# Patient Record
Sex: FEMALE | Race: WHITE | Employment: PART TIME | URBAN - METROPOLITAN AREA
[De-identification: names, ages, dates, MRNs, and addresses within clinical notes are randomized per-mention and may not be internally consistent; named-entity substitution may affect disease eponyms.]

---

## 2023-10-08 SDOH — ECONOMIC STABILITY: FOOD INSECURITY: WITHIN THE PAST 12 MONTHS, THE FOOD YOU BOUGHT JUST DIDN'T LAST AND YOU DIDN'T HAVE MONEY TO GET MORE.: NEVER TRUE

## 2023-10-08 SDOH — ECONOMIC STABILITY: FOOD INSECURITY: WITHIN THE PAST 12 MONTHS, YOU WORRIED THAT YOUR FOOD WOULD RUN OUT BEFORE YOU GOT MONEY TO BUY MORE.: NEVER TRUE

## 2023-10-08 SDOH — ECONOMIC STABILITY: TRANSPORTATION INSECURITY
IN THE PAST 12 MONTHS, HAS LACK OF TRANSPORTATION KEPT YOU FROM MEETINGS, WORK, OR FROM GETTING THINGS NEEDED FOR DAILY LIVING?: NO

## 2023-10-08 SDOH — ECONOMIC STABILITY: HOUSING INSECURITY
IN THE LAST 12 MONTHS, WAS THERE A TIME WHEN YOU DID NOT HAVE A STEADY PLACE TO SLEEP OR SLEPT IN A SHELTER (INCLUDING NOW)?: NO

## 2023-10-08 SDOH — ECONOMIC STABILITY: INCOME INSECURITY: HOW HARD IS IT FOR YOU TO PAY FOR THE VERY BASICS LIKE FOOD, HOUSING, MEDICAL CARE, AND HEATING?: NOT VERY HARD

## 2023-10-09 ENCOUNTER — OFFICE VISIT (OUTPATIENT)
Age: 23
End: 2023-10-09

## 2023-10-09 VITALS
DIASTOLIC BLOOD PRESSURE: 81 MMHG | HEIGHT: 66 IN | HEART RATE: 87 BPM | WEIGHT: 162 LBS | SYSTOLIC BLOOD PRESSURE: 119 MMHG | BODY MASS INDEX: 26.03 KG/M2

## 2023-10-09 DIAGNOSIS — Z12.4 CERVICAL CANCER SCREENING: Primary | ICD-10-CM

## 2023-10-09 ASSESSMENT — ENCOUNTER SYMPTOMS
CONSTIPATION: 0
ABDOMINAL DISTENTION: 0
BLOOD IN STOOL: 0
ABDOMINAL PAIN: 0
VOMITING: 0
NAUSEA: 0
RECTAL PAIN: 0
DIARRHEA: 0

## 2023-10-09 ASSESSMENT — PATIENT HEALTH QUESTIONNAIRE - PHQ9
SUM OF ALL RESPONSES TO PHQ QUESTIONS 1-9: 0
2. FEELING DOWN, DEPRESSED OR HOPELESS: 0
1. LITTLE INTEREST OR PLEASURE IN DOING THINGS: 0
SUM OF ALL RESPONSES TO PHQ9 QUESTIONS 1 & 2: 0
SUM OF ALL RESPONSES TO PHQ QUESTIONS 1-9: 0

## 2023-10-12 LAB — GYNECOLOGY CYTOLOGY REPORT: NORMAL

## 2024-05-06 ENCOUNTER — TELEPHONE (OUTPATIENT)
Age: 24
End: 2024-05-06

## 2024-05-06 NOTE — TELEPHONE ENCOUNTER
New Ob- lmp 3/2/2024.positive pregnancy test on 5/4/24. on please  place an order for dating ultrasound, for scheduling.

## 2024-05-07 DIAGNOSIS — N91.2 AMENORRHEA: Primary | ICD-10-CM

## 2024-05-07 NOTE — PROGRESS NOTES
Positive home pregnancy test dating ultrasound ordered   Exclude Pending Lab, Cardiology, and Radiology orders from printing on the Patient's Discharge Instructions, due to Privacy Concerns.

## 2024-05-29 ENCOUNTER — ANCILLARY PROCEDURE (OUTPATIENT)
Dept: OBGYN CLINIC | Age: 24
End: 2024-05-29
Payer: COMMERCIAL

## 2024-05-29 ENCOUNTER — INITIAL PRENATAL (OUTPATIENT)
Dept: OBGYN CLINIC | Age: 24
End: 2024-05-29
Payer: COMMERCIAL

## 2024-05-29 DIAGNOSIS — Z34.90 PREGNANCY, UNSPECIFIED GESTATIONAL AGE: Primary | ICD-10-CM

## 2024-05-29 DIAGNOSIS — O26.849 SIGNIFICANT DISCREPANCY BETWEEN UTERINE SIZE AND CLINICAL DATES, ANTEPARTUM: ICD-10-CM

## 2024-05-29 DIAGNOSIS — Z34.00 PRIMIGRAVIDA, ANTEPARTUM: ICD-10-CM

## 2024-05-29 PROCEDURE — 99999 PR OFFICE/OUTPT VISIT,PROCEDURE ONLY: CPT | Performed by: OBSTETRICS & GYNECOLOGY

## 2024-05-29 PROCEDURE — 76813 OB US NUCHAL MEAS 1 GEST: CPT | Performed by: OBSTETRICS & GYNECOLOGY

## 2024-05-29 PROCEDURE — 99203 OFFICE O/P NEW LOW 30 MIN: CPT

## 2024-05-29 NOTE — PROGRESS NOTES
24    Lisa Rosado, DO  [address] [address]     RE:  ANNI SANDOVAL  : 2000   AGE: 23 y.o.      Dear Dr. Rosado:    Anni Sandoval was scheduled for a fetal ultrasound assessment only.  An ultrasound report is included under the imaging tab from today's date.    Transabdominal imaging was performed.  A living dorman intrauterine embryo was identified with a crown-rump length of 21.4 mm, consistent with an estimated gestational age of 8 weeks 5 days.  These dating parameters are inconsistent with the patient's last menstrual period dating parameters.  The embryonic heart rate was 171 bpm.  The amniotic fluid volume was within normal limits.  A yolk sac was visualized.  Visualization of the embryonic anatomy is limited secondary to the early gestational age.  There was no significant free peritoneal fluid noted.  There were no apparent adnexal masses.    No further follow-up assessments have been scheduled in our office, however; we would be happy to see your patient at any time if you request.    If you have any questions regarding her management, please contact me at your convenience and thank you for allowing me to participate in her care    Sincerely,        Nick Marcos MD, MS, FACOG, FACS, RDCS, RDMS, RVT  Director Maternal-Fetal Medicine  [facility]  [phone]

## 2024-06-26 ENCOUNTER — ROUTINE PRENATAL (OUTPATIENT)
Dept: OBGYN | Age: 24
End: 2024-06-26
Payer: COMMERCIAL

## 2024-06-26 VITALS
BODY MASS INDEX: 26.27 KG/M2 | WEIGHT: 160.3 LBS | DIASTOLIC BLOOD PRESSURE: 78 MMHG | HEART RATE: 81 BPM | SYSTOLIC BLOOD PRESSURE: 118 MMHG

## 2024-06-26 DIAGNOSIS — O99.891 ASYMPTOMATIC BACTERIURIA DURING PREGNANCY IN SECOND TRIMESTER: Primary | ICD-10-CM

## 2024-06-26 DIAGNOSIS — Z34.91 ENCOUNTER FOR SUPERVISION OF NORMAL PREGNANCY IN FIRST TRIMESTER, UNSPECIFIED GRAVIDITY: ICD-10-CM

## 2024-06-26 DIAGNOSIS — R82.71 ASYMPTOMATIC BACTERIURIA DURING PREGNANCY IN SECOND TRIMESTER: Primary | ICD-10-CM

## 2024-06-26 DIAGNOSIS — Z34.91 ENCOUNTER FOR SUPERVISION OF NORMAL PREGNANCY IN FIRST TRIMESTER, UNSPECIFIED GRAVIDITY: Primary | ICD-10-CM

## 2024-06-26 LAB
AMPHETAMINE SCREEN URINE: NEGATIVE
BARBITURATE SCREEN URINE: NEGATIVE
BENZODIAZEPINE SCREEN, URINE: NEGATIVE
BUPRENORPHINE URINE: NEGATIVE
CANNABINOID SCREEN URINE: NEGATIVE
COCAINE METABOLITE, URINE: NEGATIVE
FENTANYL URINE: NEGATIVE
METHADONE SCREEN, URINE: NEGATIVE
OPIATES, URINE: NEGATIVE
OXYCODONE SCREEN URINE: NEGATIVE
PCP,URINE: NEGATIVE
TEST INFORMATION: NORMAL

## 2024-06-26 PROCEDURE — 99213 OFFICE O/P EST LOW 20 MIN: CPT | Performed by: OBSTETRICS & GYNECOLOGY

## 2024-06-26 PROCEDURE — 0500F INITIAL PRENATAL CARE VISIT: CPT | Performed by: OBSTETRICS & GYNECOLOGY

## 2024-06-26 PROCEDURE — 81002 URINALYSIS NONAUTO W/O SCOPE: CPT | Performed by: OBSTETRICS & GYNECOLOGY

## 2024-06-26 ASSESSMENT — PATIENT HEALTH QUESTIONNAIRE - PHQ9
2. FEELING DOWN, DEPRESSED OR HOPELESS: NOT AT ALL
SUM OF ALL RESPONSES TO PHQ QUESTIONS 1-9: 0
SUM OF ALL RESPONSES TO PHQ9 QUESTIONS 1 & 2: 0
1. LITTLE INTEREST OR PLEASURE IN DOING THINGS: NOT AT ALL

## 2024-06-26 NOTE — PROGRESS NOTES
Anni and her  present for initial prenatal visit dates confirmed EDC 1 - 3 - 25.  No significant PMH.  No TAD.  GC chlamydia culture obtained  [facility] and [facility] reviewed and ordered  Initial prenatal labs ordered  First trimester changes  Handout on mental health resources, OTC meds, Tdap, flu shot, foods

## 2024-06-26 NOTE — PROGRESS NOTES
New OB patient alert and pleasant today with no complaints.  Here today for initial prenatal visit. Previously seen by Dr Harding in the past. Fetal Nuchal US in chart.   Unable to provide urine specimen at this time. Patient states she had a normal pap done last year by Dr Harding. Urine cc, and vag culture obtained, labeled and sent to lab.  Discharge instructions given and patient directed to call the office with any questions or concerns and verbalized understanding.

## 2024-06-28 LAB
C TRACH DNA GENITAL QL NAA+PROBE: NEGATIVE
N. GONORRHOEAE DNA: NEGATIVE

## 2024-06-29 ENCOUNTER — HOSPITAL ENCOUNTER (OUTPATIENT)
Age: 24
Discharge: HOME | End: 2024-06-29

## 2024-06-29 LAB
CULTURE: ABNORMAL
SPECIMEN DESCRIPTION: ABNORMAL

## 2024-07-01 ENCOUNTER — HOSPITAL ENCOUNTER (OUTPATIENT)
Age: 24
Discharge: HOME | End: 2024-07-01
Payer: COMMERCIAL

## 2024-07-01 DIAGNOSIS — Z34.91 ENCOUNTER FOR SUPERVISION OF NORMAL PREGNANCY IN FIRST TRIMESTER, UNSPECIFIED GRAVIDITY: ICD-10-CM

## 2024-07-01 LAB
ABO + RH BLD: NORMAL
ERYTHROCYTE [DISTWIDTH] IN BLOOD BY AUTOMATED COUNT: 13 % (ref 11.5–15)
HCT VFR BLD AUTO: 38.2 % (ref 34–48)
HGB BLD-MCNC: 12.3 G/DL (ref 11.5–15.5)
MCH RBC QN AUTO: 30.2 PG (ref 26–35)
MCHC RBC AUTO-ENTMCNC: 32.2 G/DL (ref 32–34.5)
MCV RBC AUTO: 93.9 FL (ref 80–99.9)
PLATELET # BLD AUTO: 317 K/UL (ref 130–450)
PMV BLD AUTO: 9.7 FL (ref 7–12)
RBC # BLD AUTO: 4.07 M/UL (ref 3.5–5.5)
WBC OTHER # BLD: 12.5 K/UL (ref 4.5–11.5)

## 2024-07-01 PROCEDURE — 87389 HIV-1 AG W/HIV-1&-2 AB AG IA: CPT

## 2024-07-01 PROCEDURE — 86901 BLOOD TYPING SEROLOGIC RH(D): CPT

## 2024-07-01 PROCEDURE — 87340 HEPATITIS B SURFACE AG IA: CPT

## 2024-07-01 PROCEDURE — 86762 RUBELLA ANTIBODY: CPT

## 2024-07-01 PROCEDURE — 86592 SYPHILIS TEST NON-TREP QUAL: CPT

## 2024-07-01 PROCEDURE — 86803 HEPATITIS C AB TEST: CPT

## 2024-07-01 PROCEDURE — 36415 COLL VENOUS BLD VENIPUNCTURE: CPT

## 2024-07-01 PROCEDURE — 85027 COMPLETE CBC AUTOMATED: CPT

## 2024-07-01 PROCEDURE — 86900 BLOOD TYPING SEROLOGIC ABO: CPT

## 2024-07-02 LAB
HBV SURFACE AG SERPL QL IA: NONREACTIVE
HCV AB SERPL QL IA: NONREACTIVE
HIV 1+2 AB+HIV1 P24 AG SERPL QL IA: NONREACTIVE
RPR SER QL: NONREACTIVE
RUBV IGG SERPL QL IA: NORMAL IU/ML

## 2024-07-02 RX ORDER — CEPHALEXIN 500 MG/1
500 CAPSULE ORAL 3 TIMES DAILY
Qty: 15 CAPSULE | Refills: 0 | Status: SHIPPED | OUTPATIENT
Start: 2024-07-02 | End: 2024-07-07

## 2024-07-08 LAB
Lab: NORMAL
NTRA FETAL FRACTION: NORMAL
NTRA GENDER OF FETUS: NORMAL
NTRA MONOSOMY X AGE-BASED RISK TEXT: NORMAL
NTRA MONOSOMY X RESULT TEXT: NORMAL
NTRA MONOSOMY X RISK SCORE TEXT: NORMAL
NTRA TRIPLOIDY RESULT TEXT: NORMAL
NTRA TRISOMY 13 AGE-BASED RISK TEXT: NORMAL
NTRA TRISOMY 13 RESULT TEXT: NORMAL
NTRA TRISOMY 13 RISK SCORE TEXT: NORMAL
NTRA TRISOMY 18 AGE-BASED RISK TEXT: NORMAL
NTRA TRISOMY 18 RESULT TEXT: NORMAL
NTRA TRISOMY 18 RISK SCORE TEXT: NORMAL
NTRA TRISOMY 21 AGE-BASED RISK TEXT: NORMAL
NTRA TRISOMY 21 RESULT TEXT: NORMAL
NTRA TRISOMY 21 RISK SCORE TEXT: NORMAL

## 2024-07-09 ENCOUNTER — TELEPHONE (OUTPATIENT)
Dept: OBGYN | Age: 24
End: 2024-07-09

## 2024-07-09 NOTE — TELEPHONE ENCOUNTER
----- Message from Lisa Rosado DO sent at 7/9/2024  9:33 AM EDT -----  Please let her know that her panorama was low risk for Down syndrome, chromosome 18 and 13 if she wants to know the sex it is a male

## 2024-07-09 NOTE — TELEPHONE ENCOUNTER
Notifed, she would like the sex to be a surprise, so she will  results at her next visit in a sealed envelope.

## 2024-07-10 LAB
Lab: ABNORMAL
Lab: POSITIVE
NTRA ALPHA-THALASSEMIA: NEGATIVE
NTRA BETA-HEMOGLOBINOPATHIES: NEGATIVE
NTRA CANAVAN DISEASE: NEGATIVE
NTRA CYSTIC FIBROSIS: NEGATIVE
NTRA DUCHENNE/BECKER MUSCULAR DYSTROPHY: NEGATIVE
NTRA FAMILIAL DYSAUTONOMIA: NEGATIVE
NTRA FRAGILE X SYNDROME: NEGATIVE
NTRA GALACTOSEMIA: NEGATIVE
NTRA GAUCHER DISEASE: NEGATIVE
NTRA MEDIUM CHAIN ACYL-COA DEHYDROGENASE DEFICIENCY: POSITIVE
NTRA POLYCYSTIC KIDNEY DISEASE, AUTOSOMAL RECESSIVE: NEGATIVE
NTRA SMITH-LEMLI-OPITZ SYNDROME: NEGATIVE
NTRA SPINAL MUSCULAR ATROPHY: NEGATIVE
NTRA TAY-SACHS DISEASE: NEGATIVE

## 2024-07-11 ENCOUNTER — TELEPHONE (OUTPATIENT)
Dept: OBGYN | Age: 24
End: 2024-07-11

## 2024-07-11 NOTE — TELEPHONE ENCOUNTER
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  Her partner should have a screening performed if he is positive for the same disease then there is a 1 in 4 chance that the baby may have that condition.  This is a condition where the person is unable to process fats and if not treated can cause problems.  The chances of them being a carrier for the same disease is very small but that is the recommendation that he be screened.  We can order that if he is agreeable to having it performed.

## 2024-07-11 NOTE — TELEPHONE ENCOUNTER
Patient notified and states that he will most likely want tested and will discuss at next visit.   Also notified of panorama results and will get gender wrote down at that visit as well.

## 2024-07-24 ENCOUNTER — ROUTINE PRENATAL (OUTPATIENT)
Dept: OBGYN | Age: 24
End: 2024-07-24
Payer: COMMERCIAL

## 2024-07-24 VITALS
BODY MASS INDEX: 26.38 KG/M2 | SYSTOLIC BLOOD PRESSURE: 126 MMHG | WEIGHT: 161 LBS | HEART RATE: 84 BPM | DIASTOLIC BLOOD PRESSURE: 84 MMHG

## 2024-07-24 DIAGNOSIS — Z3A.16 16 WEEKS GESTATION OF PREGNANCY: Primary | ICD-10-CM

## 2024-07-24 LAB
GLUCOSE URINE, POC: NEGATIVE
PROTEIN UA: NEGATIVE

## 2024-07-24 PROCEDURE — 81002 URINALYSIS NONAUTO W/O SCOPE: CPT | Performed by: OBSTETRICS & GYNECOLOGY

## 2024-07-24 PROCEDURE — 0502F SUBSEQUENT PRENATAL CARE: CPT | Performed by: OBSTETRICS & GYNECOLOGY

## 2024-07-24 PROCEDURE — 99213 OFFICE O/P EST LOW 20 MIN: CPT | Performed by: OBSTETRICS & GYNECOLOGY

## 2024-07-24 NOTE — PROGRESS NOTES
No fetal movement yet.  Occasional headaches.  Reviewed labs.  FA US ordered.  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

## 2024-08-21 ENCOUNTER — ROUTINE PRENATAL (OUTPATIENT)
Dept: OBGYN | Age: 24
End: 2024-08-21
Payer: COMMERCIAL

## 2024-08-21 VITALS
BODY MASS INDEX: 27.22 KG/M2 | HEART RATE: 92 BPM | WEIGHT: 166.1 LBS | DIASTOLIC BLOOD PRESSURE: 72 MMHG | SYSTOLIC BLOOD PRESSURE: 109 MMHG

## 2024-08-21 DIAGNOSIS — Z34.02 ENCOUNTER FOR SUPERVISION OF NORMAL FIRST PREGNANCY IN SECOND TRIMESTER: Primary | ICD-10-CM

## 2024-08-21 LAB
GLUCOSE URINE, POC: NEGATIVE
PROTEIN UA: NEGATIVE

## 2024-08-21 PROCEDURE — 99213 OFFICE O/P EST LOW 20 MIN: CPT | Performed by: OBSTETRICS & GYNECOLOGY

## 2024-08-21 PROCEDURE — 0502F SUBSEQUENT PRENATAL CARE: CPT | Performed by: OBSTETRICS & GYNECOLOGY

## 2024-08-21 NOTE — PROGRESS NOTES
Positive quickening.  No BLD or LOF FA US is scheduled.  Will need 2628-week labs next visit including a urine secondary to asymptomatic bacteriuria in the first trimester.

## 2024-09-04 ENCOUNTER — ANCILLARY PROCEDURE (OUTPATIENT)
Dept: OBGYN CLINIC | Age: 24
End: 2024-09-04
Payer: COMMERCIAL

## 2024-09-04 ENCOUNTER — INITIAL PRENATAL (OUTPATIENT)
Dept: OBGYN CLINIC | Age: 24
End: 2024-09-04
Payer: COMMERCIAL

## 2024-09-04 VITALS
DIASTOLIC BLOOD PRESSURE: 78 MMHG | SYSTOLIC BLOOD PRESSURE: 120 MMHG | WEIGHT: 167.4 LBS | BODY MASS INDEX: 27.43 KG/M2 | HEART RATE: 90 BPM

## 2024-09-04 DIAGNOSIS — Z34.00 PRIMIGRAVIDA, ANTEPARTUM: Primary | ICD-10-CM

## 2024-09-04 DIAGNOSIS — Z3A.22 22 WEEKS GESTATION OF PREGNANCY: ICD-10-CM

## 2024-09-04 DIAGNOSIS — O26.849 SIGNIFICANT DISCREPANCY BETWEEN UTERINE SIZE AND CLINICAL DATES, ANTEPARTUM: ICD-10-CM

## 2024-09-04 LAB
GLUCOSE URINE, POC: NEGATIVE MG/DL
PROTEIN UA: NEGATIVE

## 2024-09-04 PROCEDURE — 76811 OB US DETAILED SNGL FETUS: CPT | Performed by: OBSTETRICS & GYNECOLOGY

## 2024-09-04 PROCEDURE — 99999 PR OFFICE/OUTPT VISIT,PROCEDURE ONLY: CPT | Performed by: OBSTETRICS & GYNECOLOGY

## 2024-09-04 PROCEDURE — 81002 URINALYSIS NONAUTO W/O SCOPE: CPT | Performed by: OBSTETRICS & GYNECOLOGY

## 2024-09-04 PROCEDURE — 99203 OFFICE O/P NEW LOW 30 MIN: CPT | Performed by: OBSTETRICS & GYNECOLOGY

## 2024-09-04 NOTE — PATIENT INSTRUCTIONS
Please arrive for your scheduled appointment at least 15 minutes early with your actual insurance card+ a photo ID. Also if you need any refills ordered or have questions, it may take up 48 hours to reply. Please allow ample time for your refills. Call me when you use last refill. Thank you for your cooperation. You might be having an NST at your next appt. Please eat a large snack or breakfast before coming to office. Thank youCall your primary obstetrician with bleeding, leaking of fluid, abdominal tenderness, headache, blurry vision, epigastric pain and increased urinary frequency.If you are experiencing an emergency and need immediate help, call 911 or go to go emergency room or labor and delivery. Do kick counts after dinner. Call your primary obstetrician if less than 10 kicks in 2 hours after dinner.     Call your primary obstetrician with bleeding, leaking of fluid, abdominal tenderness, headache, blurry vision, epigastric pain and increased urinary frequency.if you are sick, not feeling well or have an infectious process going on please reschedule your appointment by calling [phone]. Also if any family members are not feeling well, please do not bring them to your appointment. We appreciate your cooperation. We are doing this in order to protect our pregnant mothers+ their babies.if you are sick, not feeling well or have an infectious process going on please reschedule your appointment by calling [phone]. Also if any family members are not feeling well, please do not bring them to your appointment. We appreciate your cooperation. We are doing this in order to protect our pregnant mothers+ their babies.

## 2024-09-04 NOTE — PROGRESS NOTES
Pt here for anatomy scan.  Pt denies bleeding, cramping, and lof.  Pt states good fetal movement.  
otherwise as clinically indicated.    The patient is to continue to follow with you in your office for ongoing obstetric care.     PLAN:    As noted above or sooner prn.    Sincerely,        Hayden Eubanks MD

## 2024-09-25 ENCOUNTER — ROUTINE PRENATAL (OUTPATIENT)
Dept: OBGYN | Age: 24
End: 2024-09-25
Payer: COMMERCIAL

## 2024-09-25 VITALS
WEIGHT: 177 LBS | SYSTOLIC BLOOD PRESSURE: 122 MMHG | HEART RATE: 94 BPM | DIASTOLIC BLOOD PRESSURE: 78 MMHG | BODY MASS INDEX: 29.01 KG/M2

## 2024-09-25 DIAGNOSIS — Z3A.25 25 WEEKS GESTATION OF PREGNANCY: Primary | ICD-10-CM

## 2024-09-25 LAB
GLUCOSE URINE, POC: NEGATIVE MG/DL
PROTEIN UA: NEGATIVE

## 2024-09-25 PROCEDURE — 0502F SUBSEQUENT PRENATAL CARE: CPT | Performed by: OBSTETRICS & GYNECOLOGY

## 2024-09-25 PROCEDURE — 81002 URINALYSIS NONAUTO W/O SCOPE: CPT | Performed by: OBSTETRICS & GYNECOLOGY

## 2024-09-25 PROCEDURE — 99213 OFFICE O/P EST LOW 20 MIN: CPT | Performed by: OBSTETRICS & GYNECOLOGY

## 2024-09-27 DIAGNOSIS — Z3A.25 25 WEEKS GESTATION OF PREGNANCY: ICD-10-CM

## 2024-09-27 LAB
AMOUNT GLUCOSE GIVEN: 0 G
COLLECT TIME, 1HR GLUCOSE: 1621
GLUCOSE TOLERANCE TEST 1 HOUR: 76 MG/DL
HCT VFR BLD CALC: 35.6 % (ref 34–48)
HEMOGLOBIN: 11.1 G/DL (ref 11.5–15.5)
MCH RBC QN AUTO: 30 PG (ref 26–35)
MCHC RBC AUTO-ENTMCNC: 31.2 G/DL (ref 32–34.5)
MCV RBC AUTO: 96.2 FL (ref 80–99.9)
PDW BLD-RTO: 12.9 % (ref 11.5–15)
PLATELET # BLD: 311 K/UL (ref 130–450)
PMV BLD AUTO: 10.2 FL (ref 7–12)
RBC # BLD: 3.7 M/UL (ref 3.5–5.5)
WBC # BLD: 12.3 K/UL (ref 4.5–11.5)

## 2024-09-30 LAB — RPR: NONREACTIVE

## 2024-10-23 ENCOUNTER — ROUTINE PRENATAL (OUTPATIENT)
Dept: OBGYN | Age: 24
End: 2024-10-23
Payer: COMMERCIAL

## 2024-10-23 VITALS
DIASTOLIC BLOOD PRESSURE: 84 MMHG | HEIGHT: 66 IN | SYSTOLIC BLOOD PRESSURE: 131 MMHG | BODY MASS INDEX: 29.25 KG/M2 | WEIGHT: 182 LBS | HEART RATE: 90 BPM

## 2024-10-23 DIAGNOSIS — Z3A.29 29 WEEKS GESTATION OF PREGNANCY: Primary | ICD-10-CM

## 2024-10-23 LAB
GLUCOSE URINE, POC: NEGATIVE MG/DL
PROTEIN UA: POSITIVE

## 2024-10-23 PROCEDURE — 81002 URINALYSIS NONAUTO W/O SCOPE: CPT | Performed by: OBSTETRICS & GYNECOLOGY

## 2024-10-23 PROCEDURE — 0502F SUBSEQUENT PRENATAL CARE: CPT | Performed by: OBSTETRICS & GYNECOLOGY

## 2024-10-23 PROCEDURE — 99213 OFFICE O/P EST LOW 20 MIN: CPT | Performed by: OBSTETRICS & GYNECOLOGY

## 2024-10-23 SDOH — ECONOMIC STABILITY: INCOME INSECURITY: HOW HARD IS IT FOR YOU TO PAY FOR THE VERY BASICS LIKE FOOD, HOUSING, MEDICAL CARE, AND HEATING?: NOT HARD AT ALL

## 2024-10-23 SDOH — ECONOMIC STABILITY: FOOD INSECURITY: WITHIN THE PAST 12 MONTHS, THE FOOD YOU BOUGHT JUST DIDN'T LAST AND YOU DIDN'T HAVE MONEY TO GET MORE.: NEVER TRUE

## 2024-10-23 SDOH — ECONOMIC STABILITY: FOOD INSECURITY: WITHIN THE PAST 12 MONTHS, YOU WORRIED THAT YOUR FOOD WOULD RUN OUT BEFORE YOU GOT MONEY TO BUY MORE.: NEVER TRUE

## 2024-10-23 NOTE — PROGRESS NOTES
Patient alert and pleasant with complaints of feet swelling.   Patient states she will get her flu shot at the office where her mother works.   Here today for prenatal visit.  Urine for glucose and protein obtained   Discharge instructions have been discussed with the patient. Patient advised to call our office with any questions or concerns.   Voiced understanding.   Pap smear obtained, labeled and sent to lab

## 2024-10-23 NOTE — PROGRESS NOTES
Good FM.  No BLD LOF or CTN's emotionally doing well.  Questions regarding leg edema reviewed.  Reviewed signs and symptoms of PIH and when to call reviewed signs and symptoms of  labor and when to call and fetal movement counts.  Has upcoming growth ultrasound.

## 2024-10-24 PROBLEM — Z3A.22 22 WEEKS GESTATION OF PREGNANCY: Status: RESOLVED | Noted: 2024-09-04 | Resolved: 2024-10-24

## 2024-11-06 ENCOUNTER — ANCILLARY PROCEDURE (OUTPATIENT)
Dept: OBGYN CLINIC | Age: 24
End: 2024-11-06
Payer: COMMERCIAL

## 2024-11-06 ENCOUNTER — ROUTINE PRENATAL (OUTPATIENT)
Dept: OBGYN CLINIC | Age: 24
End: 2024-11-06
Payer: COMMERCIAL

## 2024-11-06 VITALS
DIASTOLIC BLOOD PRESSURE: 77 MMHG | WEIGHT: 185 LBS | SYSTOLIC BLOOD PRESSURE: 123 MMHG | HEART RATE: 102 BPM | BODY MASS INDEX: 29.86 KG/M2

## 2024-11-06 DIAGNOSIS — O26.849 SIGNIFICANT DISCREPANCY BETWEEN UTERINE SIZE AND CLINICAL DATES, ANTEPARTUM: ICD-10-CM

## 2024-11-06 DIAGNOSIS — Z3A.31 31 WEEKS GESTATION OF PREGNANCY: ICD-10-CM

## 2024-11-06 DIAGNOSIS — Z34.00 PRIMIGRAVIDA, ANTEPARTUM: ICD-10-CM

## 2024-11-06 DIAGNOSIS — Z36.89 ENCOUNTER FOR ULTRASOUND TO CHECK FETAL GROWTH: Primary | ICD-10-CM

## 2024-11-06 LAB
GLUCOSE URINE, POC: NEGATIVE MG/DL
PROTEIN UA: NEGATIVE

## 2024-11-06 PROCEDURE — 99999 PR OFFICE/OUTPT VISIT,PROCEDURE ONLY: CPT | Performed by: OBSTETRICS & GYNECOLOGY

## 2024-11-06 PROCEDURE — 76805 OB US >/= 14 WKS SNGL FETUS: CPT | Performed by: OBSTETRICS & GYNECOLOGY

## 2024-11-06 PROCEDURE — 81002 URINALYSIS NONAUTO W/O SCOPE: CPT | Performed by: OBSTETRICS & GYNECOLOGY

## 2024-11-06 PROCEDURE — 99213 OFFICE O/P EST LOW 20 MIN: CPT

## 2024-11-06 PROCEDURE — 99213 OFFICE O/P EST LOW 20 MIN: CPT | Performed by: OBSTETRICS & GYNECOLOGY

## 2024-11-06 NOTE — PROGRESS NOTES
[facility] [place] [facility]NAL FETAL [facility]IC[facility]  [address] [place] [id number]  Dept: [phone]  Loc: [phone]     2024    RE:  Anni Sandoval     : 2000   AGE: 24 y.o.     Dear Dr. Rosado,    Thank you for allowing me to see Anni Sandoval.  As I'm sure you will recall, Anni Sandoval is a 24 y.o. [id number]'s last menstrual period was 2024. Estimated Date of Delivery: 1/3/25 at 31w5d seen in our office today for the following:    REASON FOR VISIT: Growth     Patient Active Problem List    Diagnosis Date Noted    31 weeks gestation of pregnancy 2024    Significant discrepancy between uterine size and clinical dates, antepartum 2024    Primigravida, antepartum 2024    Idiopathic scoliosis and kyphoscoliosis 2012    Attention deficit disorder 2011        PAST HISTORY:  OB History    Para Term  AB Living   1             SAB IAB Ectopic Molar Multiple Live Births                    # Outcome Date GA Lbr Davon/2nd Weight Sex Type Anes PTL Lv   1 Current                   MEDICAL:  History reviewed. No pertinent past medical history.     SURGICAL:  Past Surgical History:   Procedure Laterality Date    US I&D BREAST ABSCESS DEEP  2015    US I&D BREAST ABSCESS DEEP       ALLERGIES:    No Known Allergies      MEDICATIONS:    Current Outpatient Medications   Medication Sig Dispense Refill    Prenatal MV-Min-Fe Fum-FA-DHA (PRENATAL 1 PO) Take by mouth       No current facility-administered medications for this visit.        Social History     Socioeconomic History    Marital status:      Spouse name: None    Number of children: None    Years of education: None    Highest education level: None   Tobacco Use    Smoking status: Never    Smokeless tobacco: Never   Vaping Use    Vaping status: Never Used   Substance and Sexual Activity    Alcohol use: Yes     Alcohol/week: 2.0

## 2024-11-06 NOTE — PATIENT INSTRUCTIONS
Please arrive for your scheduled appointment at least 15 minutes early with your actual insurance card+ a photo ID. Also if you need any refills ordered or have questions, it may take up 48 hours to reply. Please allow ample time for your refills. Call me when you use last refill. Thank you for your cooperation. You might be having an NST at your next appt. Please eat a large snack or breakfast before coming to office. Thank youCall your primary obstetrician with bleeding, leaking of fluid, abdominal tenderness, headache, blurry vision, epigastric pain and increased urinary frequency.If you are experiencing an emergency and need immediate help, call 911 or go to go emergency room or labor and delivery. Do kick counts after dinner. Call your primary obstetrician if less than 10 kicks in 2 hours after dinner.     Call your primary obstetrician with bleeding, leaking of fluid, abdominal tenderness, headache, blurry vision, epigastric pain and increased urinary frequency.if you are sick, not feeling well or have an infectious process going on please reschedule your appointment by calling [phone]. Also if any family members are not feeling well, please do not bring them to your appointment. We appreciate your cooperation. We are doing this in order to protect our pregnant mothers+ their babies.if you are sick, not feeling well or have an infectious process going on please reschedule your appointment by calling [phone]. Also if any family members are not feeling well, please do not bring them to your appointment. We appreciate your cooperation. We are doing this in order to protect our pregnant mothers+ their babies.

## 2024-11-13 ENCOUNTER — ROUTINE PRENATAL (OUTPATIENT)
Dept: OBGYN | Age: 24
End: 2024-11-13
Payer: COMMERCIAL

## 2024-11-13 VITALS
BODY MASS INDEX: 30.51 KG/M2 | SYSTOLIC BLOOD PRESSURE: 128 MMHG | WEIGHT: 189 LBS | HEART RATE: 94 BPM | DIASTOLIC BLOOD PRESSURE: 85 MMHG

## 2024-11-13 DIAGNOSIS — Z3A.32 32 WEEKS GESTATION OF PREGNANCY: Primary | ICD-10-CM

## 2024-11-13 DIAGNOSIS — O23.593 VAGINITIS AFFECTING PREGNANCY IN THIRD TRIMESTER, ANTEPARTUM: ICD-10-CM

## 2024-11-13 LAB
GLUCOSE URINE, POC: NEGATIVE MG/DL
PROTEIN UA: NEGATIVE

## 2024-11-13 PROCEDURE — 90715 TDAP VACCINE 7 YRS/> IM: CPT | Performed by: OBSTETRICS & GYNECOLOGY

## 2024-11-13 PROCEDURE — 81002 URINALYSIS NONAUTO W/O SCOPE: CPT | Performed by: OBSTETRICS & GYNECOLOGY

## 2024-11-13 PROCEDURE — 99213 OFFICE O/P EST LOW 20 MIN: CPT | Performed by: OBSTETRICS & GYNECOLOGY

## 2024-11-13 PROCEDURE — 0502F SUBSEQUENT PRENATAL CARE: CPT | Performed by: OBSTETRICS & GYNECOLOGY

## 2024-11-13 NOTE — PROGRESS NOTES
Good.  No BLD LOF or CTN's emotionally doing well.  32-week ultrasound AGA vertex.  Flu shot at her mom's office  Tdap done here today.  She will check with mom's office has RSV if not will send Rx to pharmacy.  Health tracks performed today for vaginal discharge and pruritus will call with

## 2024-11-13 NOTE — PROGRESS NOTES
Patient alert and pleasant with complaints of vaginal itching and discharge. Started Monistat OTC last night.   Here today for prenatal visit.  Urine for glucose and protein obtained   Discharge instructions have been discussed with the patient. Patient advised to call our office with any questions or concerns.   Voiced understanding.   Tdap given as ordered right deltoid  Health Tracks obtained, labeled and sent to lab

## 2024-11-14 ENCOUNTER — TELEPHONE (OUTPATIENT)
Dept: OBGYN | Age: 24
End: 2024-11-14

## 2024-11-14 DIAGNOSIS — O23.593 VAGINITIS AFFECTING PREGNANCY IN THIRD TRIMESTER, ANTEPARTUM: ICD-10-CM

## 2024-11-14 RX ORDER — TERCONAZOLE 0.4 %
CREAM WITH APPLICATOR VAGINAL
Qty: 1 EACH | Refills: 0 | Status: SHIPPED | OUTPATIENT
Start: 2024-11-14 | End: 2024-11-21

## 2024-11-14 RX ORDER — METRONIDAZOLE 250 MG/1
250 TABLET ORAL 3 TIMES DAILY
Qty: 21 TABLET | Refills: 0 | Status: SHIPPED | OUTPATIENT
Start: 2024-11-14 | End: 2024-11-21

## 2024-11-14 NOTE — TELEPHONE ENCOUNTER
----- Message from Dr. Lisa Rosado, DO sent at 11/14/2024  1:47 PM EST -----  Please let her know that her culture returned positive for both yeast and bacterial vaginosis this is very common in pregnancy not a sexually transmitted infection treating with Flagyl 250 3 times daily for 7 days and Terazol 7.  I would recommend starting the Terazol 7 on the fourth day of the Flagyl.

## 2024-11-14 NOTE — PROGRESS NOTES
Health tracks is positive for BV and Candida sent Rx to pharmacy for Flagyl 250 3 times daily for 7 days and Terazol 7.

## 2024-11-26 ENCOUNTER — ROUTINE PRENATAL (OUTPATIENT)
Dept: OBGYN | Age: 24
End: 2024-11-26
Payer: COMMERCIAL

## 2024-11-26 VITALS
HEART RATE: 93 BPM | WEIGHT: 192 LBS | BODY MASS INDEX: 30.99 KG/M2 | DIASTOLIC BLOOD PRESSURE: 74 MMHG | SYSTOLIC BLOOD PRESSURE: 123 MMHG

## 2024-11-26 DIAGNOSIS — Z3A.34 34 WEEKS GESTATION OF PREGNANCY: Primary | ICD-10-CM

## 2024-11-26 LAB
GLUCOSE URINE, POC: NEGATIVE MG/DL
PROTEIN UA: ABNORMAL

## 2024-11-26 PROCEDURE — 0502F SUBSEQUENT PRENATAL CARE: CPT | Performed by: OBSTETRICS & GYNECOLOGY

## 2024-11-26 PROCEDURE — 99213 OFFICE O/P EST LOW 20 MIN: CPT | Performed by: OBSTETRICS & GYNECOLOGY

## 2024-11-26 PROCEDURE — 81002 URINALYSIS NONAUTO W/O SCOPE: CPT | Performed by: OBSTETRICS & GYNECOLOGY

## 2024-11-26 NOTE — PROGRESS NOTES
Patient alert and pleasant with complaints of back pain/sciatica pain  Here today for prenatal visit. She already got her flu and tdap vaccines.   Urine for glucose and protein obtained   Discharge instructions have been discussed with the patient. Patient advised to call our office with any questions or concerns.   Voiced understanding.

## 2024-12-10 ENCOUNTER — ROUTINE PRENATAL (OUTPATIENT)
Dept: OBGYN | Age: 24
End: 2024-12-10
Payer: COMMERCIAL

## 2024-12-10 VITALS
WEIGHT: 196 LBS | HEART RATE: 95 BPM | DIASTOLIC BLOOD PRESSURE: 75 MMHG | BODY MASS INDEX: 31.64 KG/M2 | SYSTOLIC BLOOD PRESSURE: 114 MMHG

## 2024-12-10 DIAGNOSIS — Z3A.36 36 WEEKS GESTATION OF PREGNANCY: Primary | ICD-10-CM

## 2024-12-10 LAB
GLUCOSE URINE, POC: NEGATIVE MG/DL
PROTEIN UA: POSITIVE

## 2024-12-10 PROCEDURE — 81002 URINALYSIS NONAUTO W/O SCOPE: CPT | Performed by: OBSTETRICS & GYNECOLOGY

## 2024-12-10 PROCEDURE — 99213 OFFICE O/P EST LOW 20 MIN: CPT | Performed by: OBSTETRICS & GYNECOLOGY

## 2024-12-10 PROCEDURE — 0502F SUBSEQUENT PRENATAL CARE: CPT | Performed by: OBSTETRICS & GYNECOLOGY

## 2024-12-10 NOTE — PROGRESS NOTES
Patient alert and pleasant with complaints of low abdominal cramping and some spotting off and on after wiping. Also having some right leg swelling with edema.   Here today for prenatal visit.  Urine for glucose and protein obtained   Discharge instructions have been discussed with the patient. Patient advised to call our office with any questions or concerns.   Voiced understanding.   GBS obtained, labeled and sent to lab

## 2024-12-10 NOTE — PROGRESS NOTES
Good FM.  No LOF or CTN's.  Does report occasional spotting no regular contractions.  Right lower extremity swelling occasionally.  Reviewed third trimester changes  When to go to the hospital  Preeclampsia signs and symptoms  GBS obtained

## 2024-12-14 LAB
CULTURE: NORMAL
SPECIMEN DESCRIPTION: NORMAL

## 2024-12-17 ENCOUNTER — ROUTINE PRENATAL (OUTPATIENT)
Dept: OBGYN | Age: 24
End: 2024-12-17
Payer: COMMERCIAL

## 2024-12-17 VITALS
SYSTOLIC BLOOD PRESSURE: 129 MMHG | WEIGHT: 198 LBS | HEART RATE: 88 BPM | BODY MASS INDEX: 31.96 KG/M2 | DIASTOLIC BLOOD PRESSURE: 81 MMHG

## 2024-12-17 DIAGNOSIS — Z3A.37 37 WEEKS GESTATION OF PREGNANCY: Primary | ICD-10-CM

## 2024-12-17 LAB
GLUCOSE URINE, POC: NEGATIVE MG/DL
PROTEIN UA: NEGATIVE

## 2024-12-17 PROCEDURE — 81002 URINALYSIS NONAUTO W/O SCOPE: CPT | Performed by: OBSTETRICS & GYNECOLOGY

## 2024-12-17 PROCEDURE — 0502F SUBSEQUENT PRENATAL CARE: CPT | Performed by: OBSTETRICS & GYNECOLOGY

## 2024-12-17 PROCEDURE — 99213 OFFICE O/P EST LOW 20 MIN: CPT | Performed by: OBSTETRICS & GYNECOLOGY

## 2024-12-17 NOTE — PROGRESS NOTES
Patient alert and pleasant with no complaints.  Here today for prenatal visit.  Urine for glucose and protein obtained   Discharge instructions have been discussed with the patient. Patient advised to call our office with any questions or concerns.   Voiced understanding.

## 2024-12-23 ENCOUNTER — ANESTHESIA EVENT (OUTPATIENT)
Dept: LABOR AND DELIVERY | Age: 24
End: 2024-12-23
Payer: COMMERCIAL

## 2024-12-23 ENCOUNTER — ROUTINE PRENATAL (OUTPATIENT)
Dept: OBGYN | Age: 24
End: 2024-12-23
Payer: COMMERCIAL

## 2024-12-23 ENCOUNTER — ANESTHESIA (OUTPATIENT)
Dept: LABOR AND DELIVERY | Age: 24
End: 2024-12-23
Payer: COMMERCIAL

## 2024-12-23 ENCOUNTER — HOSPITAL ENCOUNTER (INPATIENT)
Age: 24
LOS: 2 days | Discharge: HOME | End: 2024-12-25
Attending: STUDENT IN AN ORGANIZED HEALTH CARE EDUCATION/TRAINING PROGRAM | Admitting: STUDENT IN AN ORGANIZED HEALTH CARE EDUCATION/TRAINING PROGRAM
Payer: COMMERCIAL

## 2024-12-23 VITALS
HEART RATE: 91 BPM | TEMPERATURE: 98.1 F | BODY MASS INDEX: 32.17 KG/M2 | SYSTOLIC BLOOD PRESSURE: 122 MMHG | WEIGHT: 199.3 LBS | DIASTOLIC BLOOD PRESSURE: 81 MMHG | OXYGEN SATURATION: 97 %

## 2024-12-23 DIAGNOSIS — Z34.93 PRENATAL CARE IN THIRD TRIMESTER: Primary | ICD-10-CM

## 2024-12-23 PROBLEM — O42.90 AMNIOTIC FLUID LEAKING: Status: ACTIVE | Noted: 2024-12-23

## 2024-12-23 LAB
ABO + RH BLD: NORMAL
AMPHET UR QL SCN: NEGATIVE
ARM BAND NUMBER: NORMAL
BARBITURATES UR QL SCN: NEGATIVE
BENZODIAZ UR QL: NEGATIVE
BLOOD BANK SAMPLE EXPIRATION: NORMAL
BLOOD GROUP ANTIBODIES SERPL: NEGATIVE
BUPRENORPHINE UR QL: NEGATIVE
CANNABINOIDS UR QL SCN: NEGATIVE
COCAINE UR QL SCN: NEGATIVE
ERYTHROCYTE [DISTWIDTH] IN BLOOD BY AUTOMATED COUNT: 14.8 % (ref 11.5–15)
FENTANYL UR QL: NEGATIVE
GLUCOSE URINE, POC: NEGATIVE MG/DL
HCT VFR BLD AUTO: 32.7 % (ref 34–48)
HGB BLD-MCNC: 9.8 G/DL (ref 11.5–15.5)
MCH RBC QN AUTO: 26.1 PG (ref 26–35)
MCHC RBC AUTO-ENTMCNC: 30 G/DL (ref 32–34.5)
MCV RBC AUTO: 87 FL (ref 80–99.9)
METHADONE UR QL: NEGATIVE
OPIATES UR QL SCN: NEGATIVE
OXYCODONE UR QL SCN: NEGATIVE
PCP UR QL SCN: NEGATIVE
PLATELET # BLD AUTO: 292 K/UL (ref 130–450)
PMV BLD AUTO: 10.4 FL (ref 7–12)
PROTEIN UA: POSITIVE
RBC # BLD AUTO: 3.76 M/UL (ref 3.5–5.5)
TEST INFORMATION: NORMAL
WBC OTHER # BLD: 11 K/UL (ref 4.5–11.5)

## 2024-12-23 PROCEDURE — 99213 OFFICE O/P EST LOW 20 MIN: CPT | Performed by: STUDENT IN AN ORGANIZED HEALTH CARE EDUCATION/TRAINING PROGRAM

## 2024-12-23 PROCEDURE — 81002 URINALYSIS NONAUTO W/O SCOPE: CPT | Performed by: STUDENT IN AN ORGANIZED HEALTH CARE EDUCATION/TRAINING PROGRAM

## 2024-12-23 PROCEDURE — 1220000001 HC SEMI PRIVATE L&D R&B

## 2024-12-23 PROCEDURE — 80307 DRUG TEST PRSMV CHEM ANLYZR: CPT

## 2024-12-23 PROCEDURE — 2580000003 HC RX 258: Performed by: STUDENT IN AN ORGANIZED HEALTH CARE EDUCATION/TRAINING PROGRAM

## 2024-12-23 PROCEDURE — 0502F SUBSEQUENT PRENATAL CARE: CPT | Performed by: STUDENT IN AN ORGANIZED HEALTH CARE EDUCATION/TRAINING PROGRAM

## 2024-12-23 PROCEDURE — 86901 BLOOD TYPING SEROLOGIC RH(D): CPT

## 2024-12-23 PROCEDURE — 86850 RBC ANTIBODY SCREEN: CPT

## 2024-12-23 PROCEDURE — 86900 BLOOD TYPING SEROLOGIC ABO: CPT

## 2024-12-23 PROCEDURE — 85027 COMPLETE CBC AUTOMATED: CPT

## 2024-12-23 PROCEDURE — 6360000002 HC RX W HCPCS: Performed by: FAMILY MEDICINE

## 2024-12-23 RX ORDER — SODIUM CHLORIDE, SODIUM LACTATE, POTASSIUM CHLORIDE, AND CALCIUM CHLORIDE .6; .31; .03; .02 G/100ML; G/100ML; G/100ML; G/100ML
500 INJECTION, SOLUTION INTRAVENOUS ONCE
Status: COMPLETED | OUTPATIENT
Start: 2024-12-23 | End: 2024-12-23

## 2024-12-23 RX ORDER — SODIUM CHLORIDE, SODIUM LACTATE, POTASSIUM CHLORIDE, CALCIUM CHLORIDE 600; 310; 30; 20 MG/100ML; MG/100ML; MG/100ML; MG/100ML
INJECTION, SOLUTION INTRAVENOUS CONTINUOUS
Status: DISCONTINUED | OUTPATIENT
Start: 2024-12-23 | End: 2024-12-24

## 2024-12-23 RX ORDER — NALOXONE HYDROCHLORIDE 0.4 MG/ML
INJECTION, SOLUTION INTRAMUSCULAR; INTRAVENOUS; SUBCUTANEOUS PRN
Status: DISCONTINUED | OUTPATIENT
Start: 2024-12-23 | End: 2024-12-24

## 2024-12-23 RX ORDER — ONDANSETRON 2 MG/ML
4 INJECTION INTRAMUSCULAR; INTRAVENOUS EVERY 6 HOURS PRN
Status: DISCONTINUED | OUTPATIENT
Start: 2024-12-23 | End: 2024-12-24

## 2024-12-23 RX ADMIN — Medication 1 MILLI-UNITS/MIN: at 21:10

## 2024-12-23 RX ADMIN — SODIUM CHLORIDE, POTASSIUM CHLORIDE, SODIUM LACTATE AND CALCIUM CHLORIDE 500 ML: 600; 310; 30; 20 INJECTION, SOLUTION INTRAVENOUS at 18:15

## 2024-12-23 NOTE — PROGRESS NOTES
Spoke with Dr. Dewey in regards to pts fht's, ctx pattern and SROM since 10:00 12/22/24.  Pt receiving IV fluid bolus. Orders to notify Dr. Dewey if pts temperature is above 100.4

## 2024-12-23 NOTE — PROGRESS NOTES
Patient alert and pleasant with no complaints.  Here today for prenatal visit.  Fetal heart tones obtained without difficulty.  Urine for glucose obtained with negative results. Urine for protein obtained with trace results.  Assisted with pelvic exam, no specimens obtained.   Discharge instructions have been discussed with the patient. Patient advised to call our office with any questions or concerns.   Voiced understanding.

## 2024-12-23 NOTE — ANESTHESIA PRE PROCEDURE
Department of Anesthesiology  Preprocedure Note       Name:  Anni Sandoval   Age:  24 y.o.  :  2000                                          MRN:  [id number]         Date:  2024      Surgeon: * No surgeons listed *    Procedure: * No procedures listed *    Medications prior to admission:   Prior to Admission medications    Medication Sig Start Date End Date Taking? Authorizing Provider   Prenatal MV-Min-Fe Fum-FA-DHA (PRENATAL 1 PO) Take by mouth   Yes Provider, MD Joaquin       Current medications:    No current facility-administered medications for this encounter.       Allergies:  No Known Allergies    Problem List:    Patient Active Problem List   Diagnosis Code    Significant discrepancy between uterine size and clinical dates, antepartum O26.849    Primigravida, antepartum Z34.00    Idiopathic scoliosis and kyphoscoliosis M41.20    Attention deficit disorder F98.8    31 weeks gestation of pregnancy Z3A.31       Past Medical History:  History reviewed. No pertinent past medical history.    Past Surgical History:        Procedure Laterality Date    US I&D BREAST ABSCESS DEEP  2015    US I&D BREAST ABSCESS DEEP       Social History:    Social History     Tobacco Use    Smoking status: Never    Smokeless tobacco: Never   Substance Use Topics    Alcohol use: Yes     Alcohol/week: 2.0 standard drinks of alcohol     Types: 2 Drinks containing 0.5 oz of alcohol per week                                Counseling given: Not Answered      Vital Signs (Current):   Vitals:    24 1531 24 1612   BP: 137/86    Pulse: 82    Resp: 16    Temp: 36.6 °C (97.9 °F)    TempSrc: Oral    Weight:  90.3 kg (199 lb)   Height:  1.676 m (5' 6\")                                              BP Readings from Last 3 Encounters:   24 137/86   24 122/81   24 129/81       NPO Status:                                                                                 BMI:   Wt Readings from Last 3

## 2024-12-23 NOTE — PROGRESS NOTES
SUBJECTIVE:   24 y.o.  female here for routine OB appointment. +ve FM, no Cx/vb  C/o poss lof since yesterday morning    OB History    Para Term  AB Living   1             SAB IAB Ectopic Molar Multiple Live Births                    # Outcome Date GA Lbr Davon/2nd Weight Sex Type Anes PTL Lv   1 Current                No past medical history on file.     Past Surgical History:   Procedure Laterality Date    US I&D BREAST ABSCESS DEEP  2015    US I&D BREAST ABSCESS DEEP        No family history on file.     Social History       Tobacco History       Smoking Status  Never      Smokeless Tobacco Use  Never              Alcohol History       Alcohol Use Status  Yes Drinks/Week  0 Glasses of wine, 0 Cans of beer, 0 Shots of liquor, 2 Drinks containing 0.5 oz of alcohol per week Amount  2.0 standard drinks of alcohol/wk              Drug Use       Drug Use Status  Never              Sexual Activity       Sexually Active  Yes Partners  Male                      Current Outpatient Medications:     Prenatal MV-Min-Fe Fum-FA-DHA (PRENATAL 1 PO), Take by mouth, Disp: , Rfl:      No Known Allergies     OBJECTIVE:   /81   Pulse 91   Temp 98.1 °F (36.7 °C)   Wt 90.4 kg (199 lb 4.8 oz)   LMP 2024   SpO2 97%   BMI 32.17 kg/m²     Mother's Prenatal Vitals  BP: 122/81  Weight - Scale: 90.4 kg (199 lb 4.8 oz)  Pulse: 91  Patient Position: Sitting  Alb/Glu  Albumin: Trace  Glucose: Negative  Prenatal Fetal Information  Movement: Present  Fundal height 38      Speculum: FT, small amount of fluid upon coughing    ASSESSMENT:   Nazario Sandoval is a 24 y.o. female  2.   3. 38w3d    Patient Active Problem List    Diagnosis Date Noted    31 weeks gestation of pregnancy 2024    Significant discrepancy between uterine size and clinical dates, antepartum 2024    Primigravida, antepartum 2024    Idiopathic scoliosis and kyphoscoliosis 2012     Formatting of this note

## 2024-12-23 NOTE — PROGRESS NOTES
Pt presents to l and d after seeing dr. Barkley today with complaints of leaking of fluid since yesterday 24 at 10:00.  Amnisure done and positive. Clear fluid noted. Pt states she feels fetal movement.  Pt denies any vaginal bleeding or discomfort.. gbs is negative.  .  Edc 1/3/25  38 3/7.  House officer to evaluate pt

## 2024-12-23 NOTE — H&P
Department of Obstetrics and Gynecology  Labor and Delivery   Obstetrics History and Physical      Patient Name: Anni Sandoval  YOB: 2000   MRN: [id number]    CHIEF COMPLAINT:  leakage of amniotic fluid    HISTORY OF PRESENT ILLNESS:    The patient is a 24 y.o. female,  A0, Estimated Date of Delivery: 1/3/25, EGA: 38 and 3/7 weeks presents to L&D due to concern for SROM at 10am on 24. Denies vaginal bleeding. Intermittent contractions.  Perceived fetal movement is good.    Her current obstetrical history is significant for: denies complications      Lab Review  A+/antibody negative   Rubella: Immune  RPR: NR  Hepatitis B Surface Antigen: NR  Hep C Ab: NR  HIV: NR  GC/CT: NR  Group B Strep: negative       PAST OB HISTORY  OB History    Para Term  AB Living   1             SAB IAB Ectopic Molar Multiple Live Births                    # Outcome Date GA Lbr Davon/2nd Weight Sex Type Anes PTL Lv   1 Current                Past Medical History:    History reviewed. No pertinent past medical history.     Past Surgical History:    Past Surgical History:   Procedure Laterality Date     I&D BREAST ABSCESS DEEP  2015     I&D BREAST ABSCESS DEEP        Medications Prior to Admission:  Medications Prior to Admission: Prenatal MV-Min-Fe Fum-FA-DHA (PRENATAL 1 PO), Take by mouth    Allergies:  Patient has no known allergies.    Social History:  Denies tobacco, alcohol, or drug use     History reviewed. No pertinent family history.      Review of Systems:   ROS:  Pertinent positives and negatives per HPI    PHYSICAL EXAM:Patient's last menstrual period was 2024.  /86   Pulse 82   Temp 97.9 °F (36.6 °C) (Oral)   Resp 16   Ht 1.676 m (5' 6\")   Wt 90.3 kg (199 lb)   LMP 2024   SpO2 98%   BMI 32.12 kg/m²      Patient Vitals for the past 8 hrs:   BP Temp Temp src Pulse Resp SpO2 Height Weight   24 1612 -- -- -- -- -- -- 1.676 m (5' 6\") 90.3 kg (199 lb)

## 2024-12-24 LAB
ALBUMIN SERPL-MCNC: 3.1 G/DL (ref 3.5–5.2)
ALP SERPL-CCNC: 100 U/L (ref 35–104)
ALT SERPL-CCNC: 10 U/L (ref 0–32)
ANION GAP SERPL CALCULATED.3IONS-SCNC: 11 MMOL/L (ref 7–16)
AST SERPL-CCNC: 27 U/L (ref 0–31)
BILIRUB SERPL-MCNC: 0.3 MG/DL (ref 0–1.2)
BUN SERPL-MCNC: 10 MG/DL (ref 6–20)
CALCIUM SERPL-MCNC: 8.9 MG/DL (ref 8.6–10.2)
CHLORIDE SERPL-SCNC: 106 MMOL/L (ref 98–107)
CO2 SERPL-SCNC: 19 MMOL/L (ref 22–29)
CREAT SERPL-MCNC: 0.9 MG/DL (ref 0.5–1)
CREAT UR-MCNC: 169.5 MG/DL (ref 29–226)
ERYTHROCYTE [DISTWIDTH] IN BLOOD BY AUTOMATED COUNT: 15.3 % (ref 11.5–15)
GFR, ESTIMATED: 86 ML/MIN/1.73M2
GLUCOSE SERPL-MCNC: 133 MG/DL (ref 74–99)
HCT VFR BLD AUTO: 30 % (ref 34–48)
HGB BLD-MCNC: 8.8 G/DL (ref 11.5–15.5)
MCH RBC QN AUTO: 25.2 PG (ref 26–35)
MCHC RBC AUTO-ENTMCNC: 29.3 G/DL (ref 32–34.5)
MCV RBC AUTO: 86 FL (ref 80–99.9)
PLATELET # BLD AUTO: 255 K/UL (ref 130–450)
PMV BLD AUTO: 10.5 FL (ref 7–12)
POTASSIUM SERPL-SCNC: 4.1 MMOL/L (ref 3.5–5)
PROT SERPL-MCNC: 5.9 G/DL (ref 6.4–8.3)
RBC # BLD AUTO: 3.49 M/UL (ref 3.5–5.5)
SODIUM SERPL-SCNC: 136 MMOL/L (ref 132–146)
TOTAL PROTEIN, URINE: 80 MG/DL (ref 0–12)
URATE SERPL-MCNC: 8.4 MG/DL (ref 2.4–5.7)
URINE TOTAL PROTEIN CREATININE RATIO: 0.47 (ref 0–0.2)
WBC OTHER # BLD: 21 K/UL (ref 4.5–11.5)

## 2024-12-24 PROCEDURE — 6360000002 HC RX W HCPCS

## 2024-12-24 PROCEDURE — 80053 COMPREHEN METABOLIC PANEL: CPT

## 2024-12-24 PROCEDURE — 84156 ASSAY OF PROTEIN URINE: CPT

## 2024-12-24 PROCEDURE — 59400 OBSTETRICAL CARE: CPT | Performed by: ADVANCED PRACTICE MIDWIFE

## 2024-12-24 PROCEDURE — 3700000025 EPIDURAL BLOCK: Performed by: ANESTHESIOLOGY

## 2024-12-24 PROCEDURE — 2500000003 HC RX 250 WO HCPCS

## 2024-12-24 PROCEDURE — 85027 COMPLETE CBC AUTOMATED: CPT

## 2024-12-24 PROCEDURE — 88307 TISSUE EXAM BY PATHOLOGIST: CPT

## 2024-12-24 PROCEDURE — 0KQM0ZZ REPAIR PERINEUM MUSCLE, OPEN APPROACH: ICD-10-PCS | Performed by: ADVANCED PRACTICE MIDWIFE

## 2024-12-24 PROCEDURE — 84550 ASSAY OF BLOOD/URIC ACID: CPT

## 2024-12-24 PROCEDURE — 1220000000 HC SEMI PRIVATE OB R&B

## 2024-12-24 PROCEDURE — 82570 ASSAY OF URINE CREATININE: CPT

## 2024-12-24 PROCEDURE — 6370000000 HC RX 637 (ALT 250 FOR IP): Performed by: ADVANCED PRACTICE MIDWIFE

## 2024-12-24 RX ORDER — LIDOCAINE HYDROCHLORIDE 10 MG/ML
INJECTION, SOLUTION INFILTRATION; PERINEURAL
Status: COMPLETED
Start: 2024-12-24 | End: 2024-12-24

## 2024-12-24 RX ORDER — SODIUM CHLORIDE 0.9 % (FLUSH) 0.9 %
5-40 SYRINGE (ML) INJECTION EVERY 12 HOURS SCHEDULED
Status: DISCONTINUED | OUTPATIENT
Start: 2024-12-24 | End: 2024-12-25 | Stop reason: HOSPADM

## 2024-12-24 RX ORDER — ONDANSETRON 4 MG/1
4 TABLET, ORALLY DISINTEGRATING ORAL EVERY 6 HOURS PRN
Status: DISCONTINUED | OUTPATIENT
Start: 2024-12-24 | End: 2024-12-25 | Stop reason: HOSPADM

## 2024-12-24 RX ORDER — ACETAMINOPHEN 500 MG
1000 TABLET ORAL EVERY 8 HOURS PRN
Status: DISCONTINUED | OUTPATIENT
Start: 2024-12-24 | End: 2024-12-25 | Stop reason: HOSPADM

## 2024-12-24 RX ORDER — TRANEXAMIC ACID 10 MG/ML
1000 INJECTION, SOLUTION INTRAVENOUS
Status: DISCONTINUED | OUTPATIENT
Start: 2024-12-24 | End: 2024-12-24 | Stop reason: SDUPTHER

## 2024-12-24 RX ORDER — CARBOPROST TROMETHAMINE 250 UG/ML
250 INJECTION, SOLUTION INTRAMUSCULAR PRN
Status: DISCONTINUED | OUTPATIENT
Start: 2024-12-24 | End: 2024-12-24

## 2024-12-24 RX ORDER — FERROUS SULFATE 325(65) MG
325 TABLET ORAL EVERY OTHER DAY
Status: DISCONTINUED | OUTPATIENT
Start: 2024-12-24 | End: 2024-12-25 | Stop reason: HOSPADM

## 2024-12-24 RX ORDER — MISOPROSTOL 200 UG/1
800 TABLET ORAL PRN
Status: DISCONTINUED | OUTPATIENT
Start: 2024-12-24 | End: 2024-12-25 | Stop reason: HOSPADM

## 2024-12-24 RX ORDER — ACETAMINOPHEN 650 MG
TABLET, EXTENDED RELEASE ORAL
Status: COMPLETED
Start: 2024-12-24 | End: 2024-12-24

## 2024-12-24 RX ORDER — OXYCODONE HYDROCHLORIDE 5 MG/1
5 TABLET ORAL EVERY 6 HOURS PRN
Status: DISCONTINUED | OUTPATIENT
Start: 2024-12-24 | End: 2024-12-25 | Stop reason: HOSPADM

## 2024-12-24 RX ORDER — SODIUM CHLORIDE 9 MG/ML
INJECTION, SOLUTION INTRAVENOUS PRN
Status: DISCONTINUED | OUTPATIENT
Start: 2024-12-24 | End: 2024-12-25 | Stop reason: HOSPADM

## 2024-12-24 RX ORDER — DOCUSATE SODIUM 100 MG/1
100 CAPSULE, LIQUID FILLED ORAL 2 TIMES DAILY
Status: DISCONTINUED | OUTPATIENT
Start: 2024-12-24 | End: 2024-12-25 | Stop reason: HOSPADM

## 2024-12-24 RX ORDER — SODIUM CHLORIDE 0.9 % (FLUSH) 0.9 %
5-40 SYRINGE (ML) INJECTION PRN
Status: DISCONTINUED | OUTPATIENT
Start: 2024-12-24 | End: 2024-12-25 | Stop reason: HOSPADM

## 2024-12-24 RX ORDER — ONDANSETRON 2 MG/ML
4 INJECTION INTRAMUSCULAR; INTRAVENOUS EVERY 6 HOURS PRN
Status: DISCONTINUED | OUTPATIENT
Start: 2024-12-24 | End: 2024-12-25 | Stop reason: HOSPADM

## 2024-12-24 RX ORDER — ERYTHROMYCIN 5 MG/G
OINTMENT OPHTHALMIC
Status: DISPENSED
Start: 2024-12-24 | End: 2024-12-24

## 2024-12-24 RX ORDER — MODIFIED LANOLIN
OINTMENT (GRAM) TOPICAL PRN
Status: DISCONTINUED | OUTPATIENT
Start: 2024-12-24 | End: 2024-12-25 | Stop reason: HOSPADM

## 2024-12-24 RX ORDER — MISOPROSTOL 200 UG/1
400 TABLET ORAL
Status: DISCONTINUED | OUTPATIENT
Start: 2024-12-24 | End: 2024-12-24

## 2024-12-24 RX ORDER — METHYLERGONOVINE MALEATE 0.2 MG/ML
200 INJECTION INTRAVENOUS PRN
Status: DISCONTINUED | OUTPATIENT
Start: 2024-12-24 | End: 2024-12-24

## 2024-12-24 RX ORDER — IBUPROFEN 600 MG/1
600 TABLET, FILM COATED ORAL EVERY 6 HOURS PRN
Status: DISCONTINUED | OUTPATIENT
Start: 2024-12-24 | End: 2024-12-25 | Stop reason: HOSPADM

## 2024-12-24 RX ORDER — TRANEXAMIC ACID 10 MG/ML
1000 INJECTION, SOLUTION INTRAVENOUS
Status: DISCONTINUED | OUTPATIENT
Start: 2024-12-24 | End: 2024-12-24

## 2024-12-24 RX ADMIN — LIDOCAINE HYDROCHLORIDE 200 MG: 10 INJECTION, SOLUTION INFILTRATION; PERINEURAL at 09:32

## 2024-12-24 RX ADMIN — Medication 15 ML/HR: at 02:46

## 2024-12-24 RX ADMIN — DOCUSATE SODIUM 100 MG: 100 CAPSULE, LIQUID FILLED ORAL at 20:36

## 2024-12-24 RX ADMIN — Medication 10 ML: at 02:43

## 2024-12-24 RX ADMIN — Medication: at 09:23

## 2024-12-24 RX ADMIN — IBUPROFEN 600 MG: 600 TABLET, FILM COATED ORAL at 20:36

## 2024-12-24 ASSESSMENT — PAIN DESCRIPTION - LOCATION: LOCATION: ABDOMEN

## 2024-12-24 ASSESSMENT — PAIN DESCRIPTION - ORIENTATION: ORIENTATION: LOWER

## 2024-12-24 ASSESSMENT — PAIN DESCRIPTION - DESCRIPTORS: DESCRIPTORS: CRAMPING

## 2024-12-24 ASSESSMENT — PAIN - FUNCTIONAL ASSESSMENT: PAIN_FUNCTIONAL_ASSESSMENT: ACTIVITIES ARE NOT PREVENTED

## 2024-12-24 ASSESSMENT — PAIN SCALES - GENERAL: PAINLEVEL_OUTOF10: 5

## 2024-12-24 NOTE — FLOWSHEET NOTE
Admitted to room 304 from L & D . Oriented to  nurse call lite system.O[facility] and [facility].Instructed on Post Partum Depression Evaluation and [place] Discharge Instruction Videos. States had T-Dap and Flu Vaccines. Up to bathroom with assist , voided qs. Lochia small amount. Instructed on pericare, ice applied to swollen perineum. Tolerated ambulation well. Denies pain

## 2024-12-24 NOTE — PROGRESS NOTES
Patient delivered normal  boy at 0924.  APGARS 9/9.   placed skin to skin, dried, stimulated and bulb suctioned.  IRENE Pisano CNM in attendance.  Patient and  VSS.

## 2024-12-24 NOTE — PROGRESS NOTES
Chiqui ATKINSON CNM at bedside. Patient actively pushing.  RN remains in continuous attendance at the bedside.  Assessment & evaluation of fetal heart rate ongoing via continuous EFM.

## 2024-12-24 NOTE — PROGRESS NOTES
Sign out given to BONIFACIO Pisano  CNNAZ did assess pushing and notes effective pushing with fetus @ +2 station  Reported to bedside to discussed recommendations for labs to evaluate PreE  Questions answered  Pt feeling good, continues to push with strong effort

## 2024-12-24 NOTE — ANESTHESIA PROCEDURE NOTES
Epidural Block    Patient location during procedure: OB  Start time: 12/24/2024 2:40 AM  Reason for block: labor epidural  Staffing  Performed: resident/CRNA   Resident/CRNA: Ramses Guerrero APRN - CRNA  Performed by: Ramses Guerrero APRN - CRNA  Authorized by: Ramses Guerrero APRN - CRNA    Epidural  Patient position: sitting  Prep: ChloraPrep  Patient monitoring: continuous pulse ox and frequent blood pressure checks  Approach: midline  Location: L3-4  Injection technique: SONY air  Provider prep: mask and sterile gloves  Needle  Needle type: Tuohy   Needle gauge: 18 G  Needle length: 3.5 in  Needle insertion depth: 7 cm  Catheter type: end hole  Catheter size: 20g.  Catheter at skin depth: 13 cm  Test dose: negativeCatheter Secured: tegaderm and tape  Assessment  Hemodynamics: stable  Attempts: 1  Outcomes: uncomplicated  Preanesthetic Checklist  Completed: patient identified, IV checked, site marked, risks and benefits discussed, surgical/procedural consents, equipment checked, pre-op evaluation, timeout performed, anesthesia consent given, oxygen available, monitors applied/VS acknowledged, fire risk safety assessment completed and verbalized and blood product R/B/A discussed and consented

## 2024-12-24 NOTE — PROGRESS NOTES
Called to bedside by RN for constant rectal pressure and urge to push  Pt with prolonged ROM remains afebrile with no persistent maternal tachycardia or fetal tachycardia.  Pt agreeable to pitocin infusion at 2100  MR BP x2 documented >4 hours apart.   BP (!) 142/79   Pulse (!) 107   Temp 98 °F (36.7 °C) (Oral)   Resp 16   Ht 1.676 m (5' 6\")   Wt 90.3 kg (199 lb)   LMP 2024   SpO2 99%   BMI 32.12 kg/m²   , wholly moderate variability with periods of minimal variability, spontaneous accels present, intermittent early and variable decels.   Reassured by labor progression  Toxo ctxs Q 2-3 min with pitocin on 4 mu/min  SCE c/c/-1, LUCY  2nd stage   Starting pushing 0613 - good maternal effort  Anticipate   PreE labs ordered  Dr. Dewey and Dr. Vital updated    Chiqui Thayer, RADHA - SUDARSHAN

## 2024-12-24 NOTE — PROGRESS NOTES
Patient ambulated to restroom, Pericare provided.  Panties, pad and iced applied. Gown changed and bed linens.  Patient returned to bed.  Tolerated well.

## 2024-12-24 NOTE — PROCEDURES
PROCEDURE NOTE  Date: 2024   Name: Anni Sandoval  YOB: 2000    Procedures    Department of Obstetrics and Gynecology  Spontaneous Vaginal Delivery Note    Patient:  Anni Sandoval     Admit Date:  2024  3:01 PM  Medical Record Number:  [id number]   Procedure Date: 2024 10:00 AM     Pre-delivery Diagnosis:  Term pregnancy, Induced labor, and PROM    Post-delivery Diagnosis:  Living  infant(s) and Male    Information for the patient's :  Candido Sandoval [[id number]]                  Infant Wt:   Information for the patient's :  Candido Sandoval [[id number]]         APGARS:     Information for the patient's :  Candido Sandoval [[id number]]        Anesthesia:  epidural anesthesia and 1% lidocaine for repair    Application and Delivery:  24 y.o.  female  at 38w4d admitted for induction and PROM who progressed to complete and delivered Cephalic, occiput anterior presentation via  @ 0924, under epidural anesthesia anesthesia,  over an intact perineum no episiotomy with a resulting 2nd degree and bilateral periurethral laceration, without dystocia or complication, a Live Born Male infant, weight pending, Clear fluid at delivery, bulb suctioned on perineum. A nuchal cord was present and reduced.  A delayed cord clamping was performed for 60 seconds.  Spontaneous cry,  Apgar's 1 minute:  9; 5 minute:  9. The placenta was delivered with gentle traction and was noted to be intact, whole, and that the umbilical cord had three vessels noted. Episiotomy was not needed due to a spontaneous 2nd degree (subcutaneous tissue involved) vaginal laceration and hemostatic bilateral periurethral lacerations.  Repair was necessary on 2nd degree performed in layers, per routine with  Vicryl 3.0 suture.  Cervix, rectum, sphincter intact. Sponge, needle, and instrument counts correct x 2.    Delivery Summary:  Labor & Delivery Summary  Dilation Complete Date:

## 2024-12-24 NOTE — LACTATION NOTE
First time mom.  Instructed on normal infant behavior, benefits of colostrum/breast milk for baby and mom,  benefits of skin to skin and components of safe positioning.  Encouraged rooming-in and avoidance of pacifier use until breastfeeding is well established.  Reviewed latch techniques, positioning, signs of effective milk transfer, waking techniques and the importance of frequent feedings- 8-12 times/ 24 hrs to stimulate/maintain milk production. Taught hand expression and encouraged to express drops of colostrum at start of feeding.  Reviewed hunger cues and expected urine/stool output and transition.  Encouraged to feed infant as often and for as long as the infant wishes to do so.  Mom has a double electric breast pump for home use.   Went over breastfeeding resources and the breastfeeding guide.  Offered support and encouraged to call for assistance or concerns.

## 2024-12-25 VITALS
RESPIRATION RATE: 16 BRPM | WEIGHT: 199 LBS | BODY MASS INDEX: 31.98 KG/M2 | TEMPERATURE: 98.1 F | HEART RATE: 102 BPM | SYSTOLIC BLOOD PRESSURE: 129 MMHG | OXYGEN SATURATION: 100 % | DIASTOLIC BLOOD PRESSURE: 82 MMHG | HEIGHT: 66 IN

## 2024-12-25 PROBLEM — O42.90 AMNIOTIC FLUID LEAKING: Status: RESOLVED | Noted: 2024-12-23 | Resolved: 2024-12-25

## 2024-12-25 LAB
ALBUMIN SERPL-MCNC: 3.1 G/DL (ref 3.5–5.2)
ALP SERPL-CCNC: 92 U/L (ref 35–104)
ALT SERPL-CCNC: 13 U/L (ref 0–32)
ANION GAP SERPL CALCULATED.3IONS-SCNC: 8 MMOL/L (ref 7–16)
AST SERPL-CCNC: 34 U/L (ref 0–31)
BILIRUB SERPL-MCNC: 0.3 MG/DL (ref 0–1.2)
BUN SERPL-MCNC: 8 MG/DL (ref 6–20)
CALCIUM SERPL-MCNC: 8.8 MG/DL (ref 8.6–10.2)
CHLORIDE SERPL-SCNC: 106 MMOL/L (ref 98–107)
CO2 SERPL-SCNC: 23 MMOL/L (ref 22–29)
CREAT SERPL-MCNC: 1 MG/DL (ref 0.5–1)
ERYTHROCYTE [DISTWIDTH] IN BLOOD BY AUTOMATED COUNT: 15.5 % (ref 11.5–15)
GFR, ESTIMATED: 81 ML/MIN/1.73M2
GLUCOSE SERPL-MCNC: 92 MG/DL (ref 74–99)
HCT VFR BLD AUTO: 32.8 % (ref 34–48)
HGB BLD-MCNC: 9.6 G/DL (ref 11.5–15.5)
MCH RBC QN AUTO: 25.9 PG (ref 26–35)
MCHC RBC AUTO-ENTMCNC: 29.3 G/DL (ref 32–34.5)
MCV RBC AUTO: 88.4 FL (ref 80–99.9)
PLATELET # BLD AUTO: 223 K/UL (ref 130–450)
PMV BLD AUTO: 10.7 FL (ref 7–12)
POTASSIUM SERPL-SCNC: 4.5 MMOL/L (ref 3.5–5)
PROT SERPL-MCNC: 5.9 G/DL (ref 6.4–8.3)
RBC # BLD AUTO: 3.71 M/UL (ref 3.5–5.5)
SODIUM SERPL-SCNC: 137 MMOL/L (ref 132–146)
URATE SERPL-MCNC: 7.4 MG/DL (ref 2.4–5.7)
WBC OTHER # BLD: 27.3 K/UL (ref 4.5–11.5)

## 2024-12-25 PROCEDURE — 80053 COMPREHEN METABOLIC PANEL: CPT

## 2024-12-25 PROCEDURE — 6370000000 HC RX 637 (ALT 250 FOR IP): Performed by: ADVANCED PRACTICE MIDWIFE

## 2024-12-25 PROCEDURE — 84550 ASSAY OF BLOOD/URIC ACID: CPT

## 2024-12-25 PROCEDURE — 85027 COMPLETE CBC AUTOMATED: CPT

## 2024-12-25 RX ORDER — IBUPROFEN 600 MG/1
600 TABLET, FILM COATED ORAL EVERY 6 HOURS PRN
Qty: 120 TABLET | Refills: 3 | Status: ON HOLD | OUTPATIENT
Start: 2024-12-25 | End: 2024-12-29 | Stop reason: HOSPADM

## 2024-12-25 RX ORDER — FERROUS SULFATE 325(65) MG
325 TABLET ORAL EVERY OTHER DAY
Qty: 30 TABLET | Refills: 3 | Status: SHIPPED | OUTPATIENT
Start: 2024-12-26

## 2024-12-25 RX ADMIN — Medication: at 08:48

## 2024-12-25 RX ADMIN — Medication: at 08:47

## 2024-12-25 RX ADMIN — FERROUS SULFATE TAB 325 MG (65 MG ELEMENTAL FE) 325 MG: 325 (65 FE) TAB at 08:48

## 2024-12-25 RX ADMIN — DOCUSATE SODIUM 100 MG: 100 CAPSULE, LIQUID FILLED ORAL at 08:48

## 2024-12-25 RX ADMIN — IBUPROFEN 600 MG: 600 TABLET, FILM COATED ORAL at 08:48

## 2024-12-25 ASSESSMENT — PAIN DESCRIPTION - ORIENTATION: ORIENTATION: LOWER

## 2024-12-25 ASSESSMENT — PAIN - FUNCTIONAL ASSESSMENT: PAIN_FUNCTIONAL_ASSESSMENT: ACTIVITIES ARE NOT PREVENTED

## 2024-12-25 ASSESSMENT — PAIN DESCRIPTION - DESCRIPTORS: DESCRIPTORS: CRAMPING

## 2024-12-25 ASSESSMENT — PAIN SCALES - GENERAL: PAINLEVEL_OUTOF10: 3

## 2024-12-25 ASSESSMENT — PAIN DESCRIPTION - LOCATION: LOCATION: ABDOMEN

## 2024-12-25 NOTE — DISCHARGE INSTRUCTIONS
Follow-up with your OB doctor  in  6 weeks for vaginal delivery unless otherwise instructed.   Call office for an appointment.    For breastfeeding support, you can contact our lactation specialists at [phone] or [phone]    DIET  Eat a well balanced diet focusing on foods high in fiber and protein  Drink plenty of fluids especially water.  To avoid constipation you may take a mild stool softener as recommended by your doctor or midwife.    ACTIVITY  Gradually increase your activity.  Resume exercise regimen only after advised by your doctor or midwife.  Avoid lifting anything heavier than your baby or a gallon of milk for SIX weeks.   Avoid driving until your doctor or midwife has given their approval.  Rise slowly from a lying to sitting and then a standing position.  Climb stairs one at a time.  Use caution when carrying your baby up and down the stairs.  No sexual activity for 6 weeks or until advised by your doctor - Nothing in vagina: intercourse, tampons, or douching.   Be prepared to discuss family planning at your follow-up OB visit.   You may feel tired or have a lack of energy.  You may continue your prenatal vitamin to replenish nutrients post delivery.  Nap when baby naps to catch up on sleep.  May return to work or school in 6 weeks or as directed by OB.     EMOTIONS  You may feed muhammad, sad, teary, & overwhelmed.  Contact your OB provider if you feel you may be showing signs of postpartum depression, or have thoughts of harming yourself or your infant.  If infant will not stop crying, contact another adult for help or place infant in their crib on their back and take a break.  NEVER shake your infant.      BLEEDING  Vaginal bleeding will decrease in amount over the next few weeks.  You will notice that as your activity increases, your flow may increase.  This is your body's way of telling you, you need to take things easier and rest more often.  Call your OB/ER if you are saturating more than  one maxi pad in an hour.    BREAST CARE  Take medications as recommended by your doctor or midwife for pain  If you develop a warm, red, tender area on your breast or develop a fever contact your OB provider.    For breastfeeding moms:  If you become engorged, feeding may be more difficult or painful for 1-2 days.  You may find it helpful to hand express some milk so that the infant can latch on more easily.   While breastfeeding, continue to take your prenatal vitamins as directed by your doctor or midwife.   Only take medications verified as safe for breastfeeding.    For non-breastfeeding moms:  You may apply ice packs to your breasts over your bra for twenty minutes at a time for comfort.  Avoid stimulation to your breasts, when showering allow the water to strike your back not your breasts.    Wear a good fitting bra until your milk dries, such as a sports bra.    INCISIONAL CARE / NELLIE CARE  Clean your incision in the shower with mild soap.  After shower pat the incision area dry and leave open to air.  If used, Steri-stipes should be removed by 2 weeks.  If used, Staples should be removed by the OB in office by 1 week.  If used/ordered, an abdominal binder may provide support for your incision.   Use the nellie-bottle after toileting until bleeding stops.  Cleanse your perineum from front to back  If used, stitches or internal clips will dissolve in 4-6 weeks.  You may use a sitz bath or soak in a clean tub as needed for comfort.  Kegel exercises will help restore bladder control.     SWELLING  Keep your legs elevated when sitting or lying.   When wearing stocking or socks, make sure they are not too tight.    WHEN TO CALL THE DOCTOR  If you have a temp of 100.4 or more.   If your bleeding has increased and you are saturating a pad in an hour.  Your abdomen is tender to touch.  You are passing blood clots bigger than the size of a lemon.  If you are experiencing extreme weakness or dizziness.   If you are

## 2024-12-25 NOTE — DISCHARGE SUMMARY
Discharge Summary    Date: 2024  Patient Name: Anni Sandoval    YOB: 2000     Age: 24 y.o.    Admit Date: 2024  Discharge Date: 2024  Discharge Condition:    Admission Diagnosis  Amniotic fluid leaking [O42.90]      Discharge Diagnosis  Principal Problem (Resolved):    Amniotic fluid leaking  Active Problems:    Term birth of  male  Resolved Problems:     (spontaneous vaginal delivery)    Second degree perineal laceration      Hospital Stay  Narrative of Hospital Course:  Pt had a  on 24 and had a normal postpartum hospital course with routine postpartum care. Hct 28.5. Pain well controlled. + gas. Ambulating well. Breast-feeding.     Consultants:  IP CONSULT TO LACTATION    Surgeries/procedures Performed:      Treatments:            Discharge Plan/Disposition:  Home    Hospital/Incidental Findings Requiring Follow Up:    Patient Instructions:    Diet: Regular Diet    Activity:Activity as Tolerated  For number of days (if applicable):      Other Instructions: RTO in 6 weeks  PNV + iron + motrin    Provider Follow-Up:   No follow-ups on file.     Significant Diagnostic Studies:    Recent Labs:  Admission on 2024  WBC                                           Date: 2024  Value: 11.0        Ref range: 4.5 - 11.5 k/uL    Status: Final  RBC                                           Date: 2024  Value: 3.76        Ref range: 3.50 - 5.50 m/uL   Status: Final  Hemoglobin                                    Date: 2024  Value: 9.8 (L)     Ref range: 11.5 - 15.5 g/dL   Status: Final  Hematocrit                                    Date: 2024  Value: 32.7 (L)    Ref range: 34.0 - 48.0 %      Status: Final  MCV                                           Date: 2024  Value: 87.0        Ref range: 80.0 - 99.9 fL     Status: Final  MCH                                           Date: 2024  Value: 26.1        Ref range: 26.0 - 35.0 pg      range: 98 - 107 mmol/L    Status: Final  CO2                                           Date: 12/24/2024  Value: 19 (L)      Ref range: 22 - 29 mmol/L     Status: Final  Anion Gap                                     Date: 12/24/2024  Value: 11          Ref range: 7 - 16 mmol/L      Status: Final  Glucose                                       Date: 12/24/2024  Value: 133 (H)     Ref range: 74 - 99 mg/dL      Status: Final  BUN                                           Date: 12/24/2024  Value: 10          Ref range: 6 - 20 mg/dL       Status: Final  Creatinine                                    Date: 12/24/2024  Value: 0.9         Ref range: 0.50 - 1.00 mg/dL  Status: Final  Est, Glom Filt Rate                           Date: 12/24/2024  Value: 86          Ref range: >60 mL/min/1.73m2  Status: Final                Comment:       These results are not intended for use in patients <18 years of age.        eGFR results are calculated without a race factor using the 2021 CKD-EPI equation.  Careful clinical correlation is recommended, particularly when comparing to results   calculated using previous equations.  The CKD-EPI equation is less accurate in patients with extremes of muscle mass, extra-renal   metabolism of creatine, excessive creatine ingestion, or following therapy that affects   renal tubular secretion.    Calcium                                       Date: 12/24/2024  Value: 8.9         Ref range: 8.6 - 10.2 mg/dL   Status: Final  Total Protein                                 Date: 12/24/2024  Value: 5.9 (L)     Ref range: 6.4 - 8.3 g/dL     Status: Final  Albumin                                       Date: 12/24/2024  Value: 3.1 (L)     Ref range: 3.5 - 5.2 g/dL     Status: Final  Total Bilirubin                               Date: 12/24/2024  Value: 0.3         Ref range: 0.0 - 1.2 mg/dL    Status: Final  Alkaline Phosphatase                          Date: 12/24/2024  Value: 100         Ref range: 35 - 104

## 2024-12-25 NOTE — PROGRESS NOTES
[employer] screening results were discussed with parent. Questions answered. Brochure given to parent. Advised to monitor developmental milestones and contact physician for any concerns.       Darron Zamudio CCC/RAS  Audiologist  [id number]  NPI#:  [id number]

## 2024-12-25 NOTE — ANESTHESIA POSTPROCEDURE EVALUATION
Department of Anesthesiology  Postprocedure Note    Patient: Anni Sandoval  MRN: [id number]  YOB: 2000  Date of evaluation: 12/25/2024    Procedure Summary       Date: 12/24/24 Room / Location:     Anesthesia Start: 0220 Anesthesia Stop:     Procedure: Labor Analgesia Diagnosis:     Scheduled Providers:  Responsible Provider: Samia Waite DO    Anesthesia Type: epidural ASA Status: 2            Anesthesia Type: No value filed.    Rafal Phase I:      Rafal Phase II:      Anesthesia Post Evaluation    Patient location during evaluation: bedside  Patient participation: complete - patient participated  Level of consciousness: awake and alert  Pain score: 0  Airway patency: patent  Nausea & Vomiting: no nausea and no vomiting  Cardiovascular status: hemodynamically stable  Respiratory status: acceptable  Hydration status: euvolemic  Pain management: adequate        No notable events documented.

## 2024-12-27 ENCOUNTER — APPOINTMENT (OUTPATIENT)
Dept: GENERAL RADIOLOGY | Age: 24
DRG: 776 | End: 2024-12-27
Payer: COMMERCIAL

## 2024-12-27 ENCOUNTER — HOSPITAL ENCOUNTER (INPATIENT)
Age: 24
LOS: 2 days | Discharge: HOME | DRG: 776 | End: 2024-12-29
Attending: STUDENT IN AN ORGANIZED HEALTH CARE EDUCATION/TRAINING PROGRAM | Admitting: INTERNAL MEDICINE
Payer: COMMERCIAL

## 2024-12-27 ENCOUNTER — APPOINTMENT (OUTPATIENT)
Dept: ULTRASOUND IMAGING | Age: 24
DRG: 776 | End: 2024-12-27
Payer: COMMERCIAL

## 2024-12-27 ENCOUNTER — APPOINTMENT (OUTPATIENT)
Dept: CT IMAGING | Age: 24
DRG: 776 | End: 2024-12-27
Payer: COMMERCIAL

## 2024-12-27 DIAGNOSIS — N12 PYELONEPHRITIS: Primary | ICD-10-CM

## 2024-12-27 LAB
ALBUMIN SERPL-MCNC: 2.8 G/DL (ref 3.5–5.2)
ALP SERPL-CCNC: 107 U/L (ref 35–104)
ALT SERPL-CCNC: 15 U/L (ref 0–32)
ANION GAP SERPL CALCULATED.3IONS-SCNC: 9 MMOL/L (ref 7–16)
AST SERPL-CCNC: 21 U/L (ref 0–31)
B PARAP IS1001 DNA NPH QL NAA+NON-PROBE: NOT DETECTED
B PERT DNA SPEC QL NAA+PROBE: NOT DETECTED
BACTERIA URNS QL MICRO: ABNORMAL
BASOPHILS # BLD: 0.15 K/UL (ref 0–0.2)
BASOPHILS NFR BLD: 1 % (ref 0–2)
BILIRUB SERPL-MCNC: 0.2 MG/DL (ref 0–1.2)
BILIRUB UR QL STRIP: NEGATIVE
BNP SERPL-MCNC: 870 PG/ML (ref 0–125)
BUN SERPL-MCNC: 11 MG/DL (ref 6–20)
C PNEUM DNA NPH QL NAA+NON-PROBE: NOT DETECTED
CALCIUM SERPL-MCNC: 8.3 MG/DL (ref 8.6–10.2)
CHLORIDE SERPL-SCNC: 108 MMOL/L (ref 98–107)
CLARITY UR: ABNORMAL
CO2 SERPL-SCNC: 21 MMOL/L (ref 22–29)
COLOR UR: ABNORMAL
CREAT SERPL-MCNC: 0.9 MG/DL (ref 0.5–1)
CRP SERPL HS-MCNC: 163 MG/L (ref 0–5)
EKG ATRIAL RATE: 85 BPM
EKG P AXIS: 26 DEGREES
EKG P-R INTERVAL: 130 MS
EKG Q-T INTERVAL: 376 MS
EKG QRS DURATION: 80 MS
EKG QTC CALCULATION (BAZETT): 447 MS
EKG R AXIS: 9 DEGREES
EKG T AXIS: 14 DEGREES
EKG VENTRICULAR RATE: 85 BPM
EOSINOPHIL # BLD: 0.59 K/UL (ref 0.05–0.5)
EOSINOPHILS RELATIVE PERCENT: 4 % (ref 0–6)
EPI CELLS #/AREA URNS HPF: ABNORMAL /HPF
ERYTHROCYTE [DISTWIDTH] IN BLOOD BY AUTOMATED COUNT: 15.9 % (ref 11.5–15)
ERYTHROCYTE [SEDIMENTATION RATE] IN BLOOD BY WESTERGREN METHOD: 92 MM/HR (ref 0–20)
FLUAV RNA NPH QL NAA+NON-PROBE: NOT DETECTED
FLUBV RNA NPH QL NAA+NON-PROBE: NOT DETECTED
GFR, ESTIMATED: 87 ML/MIN/1.73M2
GLUCOSE SERPL-MCNC: 104 MG/DL (ref 74–99)
GLUCOSE UR STRIP-MCNC: NEGATIVE MG/DL
HADV DNA NPH QL NAA+NON-PROBE: NOT DETECTED
HCOV 229E RNA NPH QL NAA+NON-PROBE: NOT DETECTED
HCOV HKU1 RNA NPH QL NAA+NON-PROBE: NOT DETECTED
HCOV NL63 RNA NPH QL NAA+NON-PROBE: NOT DETECTED
HCOV OC43 RNA NPH QL NAA+NON-PROBE: NOT DETECTED
HCT VFR BLD AUTO: 27.4 % (ref 34–48)
HGB BLD-MCNC: 8 G/DL (ref 11.5–15.5)
HGB UR QL STRIP.AUTO: ABNORMAL
HMPV RNA NPH QL NAA+NON-PROBE: NOT DETECTED
HPIV1 RNA NPH QL NAA+NON-PROBE: NOT DETECTED
HPIV2 RNA NPH QL NAA+NON-PROBE: NOT DETECTED
HPIV3 RNA NPH QL NAA+NON-PROBE: NOT DETECTED
HPIV4 RNA NPH QL NAA+NON-PROBE: NOT DETECTED
INFLUENZA A BY PCR: NOT DETECTED
INFLUENZA B BY PCR: NOT DETECTED
KETONES UR STRIP-MCNC: 15 MG/DL
LACTATE BLDV-SCNC: 0.9 MMOL/L (ref 0.5–1.9)
LEUKOCYTE ESTERASE UR QL STRIP: ABNORMAL
LYMPHOCYTES NFR BLD: 0.3 K/UL (ref 1.5–4)
LYMPHOCYTES RELATIVE PERCENT: 2 % (ref 20–42)
M PNEUMO DNA NPH QL NAA+NON-PROBE: NOT DETECTED
MCH RBC QN AUTO: 25.3 PG (ref 26–35)
MCHC RBC AUTO-ENTMCNC: 29.2 G/DL (ref 32–34.5)
MCV RBC AUTO: 86.7 FL (ref 80–99.9)
MONOCYTES NFR BLD: 0.89 K/UL (ref 0.1–0.95)
MONOCYTES NFR BLD: 5 % (ref 2–12)
NEUTROPHILS NFR BLD: 89 % (ref 43–80)
NEUTS SEG NFR BLD: 15.17 K/UL (ref 1.8–7.3)
NITRITE UR QL STRIP: POSITIVE
PH UR STRIP: 6 [PH] (ref 5–9)
PLATELET # BLD AUTO: 229 K/UL (ref 130–450)
PMV BLD AUTO: 9.8 FL (ref 7–12)
POTASSIUM SERPL-SCNC: 4.4 MMOL/L (ref 3.5–5)
PROT SERPL-MCNC: 5.9 G/DL (ref 6.4–8.3)
PROT UR STRIP-MCNC: 100 MG/DL
RBC # BLD AUTO: 3.16 M/UL (ref 3.5–5.5)
RBC # BLD: ABNORMAL 10*6/UL
RBC #/AREA URNS HPF: ABNORMAL /HPF
RSV BY PCR: NOT DETECTED
RSV RNA NPH QL NAA+NON-PROBE: NOT DETECTED
RV+EV RNA NPH QL NAA+NON-PROBE: DETECTED
SARS-COV-2 RDRP RESP QL NAA+PROBE: NOT DETECTED
SARS-COV-2 RNA NPH QL NAA+NON-PROBE: NOT DETECTED
SODIUM SERPL-SCNC: 138 MMOL/L (ref 132–146)
SP GR UR STRIP: 1.01 (ref 1–1.03)
SPECIMEN DESCRIPTION: ABNORMAL
SPECIMEN DESCRIPTION: NORMAL
SPECIMEN SOURCE: NORMAL
TROPONIN I SERPL HS-MCNC: 13 NG/L (ref 0–9)
TROPONIN I SERPL HS-MCNC: 8 NG/L (ref 0–9)
UROBILINOGEN UR STRIP-ACNC: 0.2 EU/DL (ref 0–1)
WBC #/AREA URNS HPF: ABNORMAL /HPF
WBC OTHER # BLD: 17.1 K/UL (ref 4.5–11.5)

## 2024-12-27 PROCEDURE — 74177 CT ABD & PELVIS W/CONTRAST: CPT

## 2024-12-27 PROCEDURE — 99285 EMERGENCY DEPT VISIT HI MDM: CPT

## 2024-12-27 PROCEDURE — 93005 ELECTROCARDIOGRAM TRACING: CPT | Performed by: STUDENT IN AN ORGANIZED HEALTH CARE EDUCATION/TRAINING PROGRAM

## 2024-12-27 PROCEDURE — 2580000003 HC RX 258: Performed by: STUDENT IN AN ORGANIZED HEALTH CARE EDUCATION/TRAINING PROGRAM

## 2024-12-27 PROCEDURE — 80053 COMPREHEN METABOLIC PANEL: CPT

## 2024-12-27 PROCEDURE — 6360000004 HC RX CONTRAST MEDICATION: Performed by: RADIOLOGY

## 2024-12-27 PROCEDURE — 0202U NFCT DS 22 TRGT SARS-COV-2: CPT

## 2024-12-27 PROCEDURE — 87086 URINE CULTURE/COLONY COUNT: CPT

## 2024-12-27 PROCEDURE — 86140 C-REACTIVE PROTEIN: CPT

## 2024-12-27 PROCEDURE — 76856 US EXAM PELVIC COMPLETE: CPT

## 2024-12-27 PROCEDURE — 6360000002 HC RX W HCPCS: Performed by: STUDENT IN AN ORGANIZED HEALTH CARE EDUCATION/TRAINING PROGRAM

## 2024-12-27 PROCEDURE — 99221 1ST HOSP IP/OBS SF/LOW 40: CPT | Performed by: OBSTETRICS & GYNECOLOGY

## 2024-12-27 PROCEDURE — 85652 RBC SED RATE AUTOMATED: CPT

## 2024-12-27 PROCEDURE — 99222 1ST HOSP IP/OBS MODERATE 55: CPT | Performed by: STUDENT IN AN ORGANIZED HEALTH CARE EDUCATION/TRAINING PROGRAM

## 2024-12-27 PROCEDURE — 6370000000 HC RX 637 (ALT 250 FOR IP): Performed by: STUDENT IN AN ORGANIZED HEALTH CARE EDUCATION/TRAINING PROGRAM

## 2024-12-27 PROCEDURE — 87502 INFLUENZA DNA AMP PROBE: CPT

## 2024-12-27 PROCEDURE — 96374 THER/PROPH/DIAG INJ IV PUSH: CPT

## 2024-12-27 PROCEDURE — 87040 BLOOD CULTURE FOR BACTERIA: CPT

## 2024-12-27 PROCEDURE — 2060000000 HC ICU INTERMEDIATE R&B

## 2024-12-27 PROCEDURE — 87634 RSV DNA/RNA AMP PROBE: CPT

## 2024-12-27 PROCEDURE — 2500000003 HC RX 250 WO HCPCS: Performed by: STUDENT IN AN ORGANIZED HEALTH CARE EDUCATION/TRAINING PROGRAM

## 2024-12-27 PROCEDURE — 87077 CULTURE AEROBIC IDENTIFY: CPT

## 2024-12-27 PROCEDURE — 87635 SARS-COV-2 COVID-19 AMP PRB: CPT

## 2024-12-27 PROCEDURE — 81001 URINALYSIS AUTO W/SCOPE: CPT

## 2024-12-27 PROCEDURE — 83605 ASSAY OF LACTIC ACID: CPT

## 2024-12-27 PROCEDURE — 84484 ASSAY OF TROPONIN QUANT: CPT

## 2024-12-27 PROCEDURE — 71045 X-RAY EXAM CHEST 1 VIEW: CPT

## 2024-12-27 PROCEDURE — 83880 ASSAY OF NATRIURETIC PEPTIDE: CPT

## 2024-12-27 PROCEDURE — 94640 AIRWAY INHALATION TREATMENT: CPT

## 2024-12-27 PROCEDURE — 85025 COMPLETE CBC W/AUTO DIFF WBC: CPT

## 2024-12-27 PROCEDURE — 93010 ELECTROCARDIOGRAM REPORT: CPT | Performed by: INTERNAL MEDICINE

## 2024-12-27 RX ORDER — IOPAMIDOL 755 MG/ML
75 INJECTION, SOLUTION INTRAVASCULAR
Status: COMPLETED | OUTPATIENT
Start: 2024-12-27 | End: 2024-12-27

## 2024-12-27 RX ORDER — FERROUS SULFATE 325(65) MG
325 TABLET ORAL EVERY OTHER DAY
Status: DISCONTINUED | OUTPATIENT
Start: 2024-12-27 | End: 2024-12-29 | Stop reason: HOSPADM

## 2024-12-27 RX ORDER — 0.9 % SODIUM CHLORIDE 0.9 %
1000 INTRAVENOUS SOLUTION INTRAVENOUS ONCE
Status: COMPLETED | OUTPATIENT
Start: 2024-12-27 | End: 2024-12-27

## 2024-12-27 RX ORDER — ALBUTEROL SULFATE 0.83 MG/ML
2.5 SOLUTION RESPIRATORY (INHALATION) EVERY 4 HOURS PRN
Status: DISCONTINUED | OUTPATIENT
Start: 2024-12-27 | End: 2024-12-29 | Stop reason: HOSPADM

## 2024-12-27 RX ORDER — ACETAMINOPHEN 325 MG/1
650 TABLET ORAL EVERY 4 HOURS PRN
Status: DISCONTINUED | OUTPATIENT
Start: 2024-12-27 | End: 2024-12-29 | Stop reason: HOSPADM

## 2024-12-27 RX ADMIN — IOPAMIDOL 75 ML: 755 INJECTION, SOLUTION INTRAVENOUS at 05:08

## 2024-12-27 RX ADMIN — ALBUTEROL SULFATE 2.5 MG: 0.83 SOLUTION RESPIRATORY (INHALATION) at 13:32

## 2024-12-27 RX ADMIN — CEFTRIAXONE SODIUM 2000 MG: 2 INJECTION, POWDER, FOR SOLUTION INTRAMUSCULAR; INTRAVENOUS at 06:51

## 2024-12-27 RX ADMIN — SODIUM CHLORIDE 1000 ML: 9 INJECTION, SOLUTION INTRAVENOUS at 02:29

## 2024-12-27 RX ADMIN — FERROUS SULFATE TAB 325 MG (65 MG ELEMENTAL FE) 325 MG: 325 (65 FE) TAB at 09:51

## 2024-12-27 ASSESSMENT — PAIN SCALES - GENERAL
PAINLEVEL_OUTOF10: 0
PAINLEVEL_OUTOF10: 0

## 2024-12-27 ASSESSMENT — PAIN DESCRIPTION - RADICULAR PAIN: RADICULAR_PAIN: ABSENT

## 2024-12-27 NOTE — CARE COORDINATION
Met with patient about diagnosis and discharge plan of care. Pt admit for pyelonephritis. Pt just discharge. Post partum day 3. +UTI, elevated wbc and temp. Iv fluids, ID consult, cultures pending, iv antibiotics. Pt lives with spouse and  baby. Mom for support. PCP is Dr Esquivel. Plan is home with no needs. Shahid mccoy

## 2024-12-27 NOTE — LACTATION NOTE
Gave manual breast pump for home.  Pt will check with her insurance for co-pay amount for electric breast pump.  Gave DME information to order breast pump.

## 2024-12-27 NOTE — H&P
[facility] History and Physical      CHIEF COMPLAINT:  fever and cough, post partum     History of Present Illness:      This is a 24 year old female with past medical history of vaginal delivery 12/23/24 without complications who presents to the ER with fever, cough, congestion and leg swelling. Vitals stable. Lab workup: CO2 21, Calcium 8.3, , Troponin 13 > 8, WBC 17.1, hgb 8.0, UA + for infection. CXR negative. CT abdomen showing right pyelonephritis. She was given ceftriaxone and 1 L bolus in ER. Decision to admit.     Informant(s) for H&P: patient     REVIEW OF SYSTEMS:  A comprehensive review of systems was negative except for: what is in the HPI    PMH:  No past medical history on file.    Surgical History:  Past Surgical History:   Procedure Laterality Date    US I&D BREAST ABSCESS DEEP  8/12/2015     I&D BREAST ABSCESS DEEP       Medications Prior to Admission:    Prior to Admission medications    Medication Sig Start Date End Date Taking? Authorizing Provider   ibuprofen (ADVIL;MOTRIN) 600 MG tablet Take 1 tablet by mouth every 6 hours as needed for Pain 12/25/24  Yes Placido Bernstein MD   benzocaine-menthol (DERMOPLAST) 20-0.5 % AERO spray Apply topically as needed for Pain 12/25/24  Yes Placido Bernstein MD   witch hazel-glycerin (TUCKS) pad Place rectally as needed. 12/25/24 1/24/25 Yes Placido Bernstein MD   ferrous sulfate (IRON 325) 325 (65 Fe) MG tablet Take 1 tablet by mouth every other day 12/26/24  Yes Placido Bernstein MD   Prenatal MV-Min-Fe Fum-FA-DHA (PRENATAL 1 PO) Take by mouth   Yes ProviderJoaquin MD       Allergies:    Patient has no known allergies.    Social History:    reports that she has never smoked. She has never used smokeless tobacco. She reports current alcohol use of about 2.0 standard drinks of alcohol per week. She reports that she does not use drugs.    Family History:   family history is not on file.       PHYSICAL EXAM:  Vitals:  /64

## 2024-12-27 NOTE — LACTATION NOTE
Pt is breastfeeding and requested electric breast pump at bedside.  Set up [employer] breast pump with supplies, instructed pt on pump use and importance of freq milk removal to maintain milk supply.  Informed pt and nurse of breast milk storage refrigerator on Mom/Baby unit.  Pt can also put breast milk on ice in room for family member to take to baby.  Encouraged to call lactation with any needs. Requests electric breast pump for home.

## 2024-12-27 NOTE — PROGRESS NOTES
Database initiated. Patient is A&O independent from home. States she uses no assistive devices and is RA at baseline.

## 2024-12-27 NOTE — PLAN OF CARE
Problem: Discharge Planning  Goal: Discharge to home or other facility with appropriate resources  Outcome: Progressing     Problem: Hematologic - Adult  Goal: Maintains hematologic stability  Outcome: Progressing     Problem: Pain  Goal: Verbalizes/displays adequate comfort level or baseline comfort level  Outcome: Progressing     Problem: Infection - Adult  Goal: Absence of infection at discharge  Outcome: Progressing  Goal: Absence of infection during hospitalization  Outcome: Progressing  Goal: Absence of fever/infection during anticipated neutropenic period  Outcome: Progressing

## 2024-12-27 NOTE — CONSULTS
Department of Gynecology  Attending Consult Note      Reason for Consult: Postpartum fever  Requesting Physician:  Internal Medicine Dr Canales    CHIEF COMPLAINT:   Postpartum fever    History obtained from patient    HISTORY OF PRESENT ILLNESS:          ID Consult was requested and Per ID NP's note : \"Patient presented to the ED at [facility] on 2023 with fever.  She recently vaginally delivered a healthy baby on 2024.  She was discharged from postpartum on 2024 and developed cough, congestion, and fevers.  She had a Tmax of 102.5 °F orally at home.  She denies any issues with her urine, no dysuria, urgency, frequency.  On exam she has no CVA tenderness.  No abdominal pain.  No nausea, vomiting, diarrhea.  Vaginal discharge is normal with no odor.     WBC in the ED was 17.1.  This was 27.3 when she was discharged on 2024.  UA does show pyuria with 3+ bacteria.  CT of the abdomen suggest pyelonephritis.  There is no abscess noted.  She is going for a pelvic ultrasound this morning.  Her main complaint is dry cough.  She was swabbed for COVID and flu.  But her full respiratory panel was not done.  She was started on ceftriaxone. \"     Past Medical History:    No past medical history on file.  Past Surgical History:        Procedure Laterality Date    US I&D BREAST ABSCESS DEEP  2015    US I&D BREAST ABSCESS DEEP         OB History    Para Term  AB Living   1 1 1     1   SAB IAB Ectopic Molar Multiple Live Births           0 1      # Outcome Date GA Lbr Davon/2nd Weight Sex Type Anes PTL Lv   1 Term 24 38w4d / 03:12 3.69 kg (8 lb 2.2 oz) M Vag-Spont EPI N ASIF       Medications Prior to Admission:   Medications Prior to Admission: ibuprofen (ADVIL;MOTRIN) 600 MG tablet, Take 1 tablet by mouth every 6 hours as needed for Pain  benzocaine-menthol (DERMOPLAST) 20-0.5 % AERO spray, Apply topically as needed for Pain  witch hazel-glycerin (TUCKS) pad, Place rectally as

## 2024-12-27 NOTE — ED PROVIDER NOTES
%    Neutrophils Absolute 15.17 (H) 1.80 - 7.30 k/uL    Lymphocytes Absolute 0.30 (L) 1.50 - 4.00 k/uL    Monocytes Absolute 0.89 0.10 - 0.95 k/uL    Eosinophils Absolute 0.59 (H) 0.05 - 0.50 k/uL    Basophils Absolute 0.15 0.00 - 0.20 k/uL    RBC Morphology 1+ HYPOCHROMIA     RBC Morphology 1+ POLYCHROMASIA     RBC Morphology 1+ BASOPHILIC STIPPLING    CMP   Result Value Ref Range    Sodium 138 132 - 146 mmol/L    Potassium 4.4 3.5 - 5.0 mmol/L    Chloride 108 (H) 98 - 107 mmol/L    CO2 21 (L) 22 - 29 mmol/L    Anion Gap 9 7 - 16 mmol/L    Glucose 104 (H) 74 - 99 mg/dL    BUN 11 6 - 20 mg/dL    Creatinine 0.9 0.50 - 1.00 mg/dL    Est, Glom Filt Rate 87 >60 mL/min/1.73m2    Calcium 8.3 (L) 8.6 - 10.2 mg/dL    Total Protein 5.9 (L) 6.4 - 8.3 g/dL    Albumin 2.8 (L) 3.5 - 5.2 g/dL    Total Bilirubin 0.2 0.0 - 1.2 mg/dL    Alkaline Phosphatase 107 (H) 35 - 104 U/L    ALT 15 0 - 32 U/L    AST 21 0 - 31 U/L   Brain Natriuretic Peptide   Result Value Ref Range    NT Pro- (H) 0 - 125 pg/mL   Troponin   Result Value Ref Range    Troponin, High Sensitivity 13 (H) 0 - 9 ng/L   Urinalysis with Microscopic   Result Value Ref Range    Color, UA Dark Yellow (A) Yellow    Turbidity UA Cloudy (A) Clear    Glucose, Ur NEGATIVE NEGATIVE mg/dL    Bilirubin, Urine NEGATIVE NEGATIVE    Ketones, Urine 15 (A) NEGATIVE mg/dL    Specific Gravity, UA 1.015 1.005 - 1.030    Urine Hgb LARGE (A) NEGATIVE    pH, Urine 6.0 5.0 - 9.0    Protein,  (A) NEGATIVE mg/dL    Urobilinogen, Urine 0.2 0.0 - 1.0 EU/dL    Nitrite, Urine POSITIVE (A) NEGATIVE    Leukocyte Esterase, Urine MODERATE (A) NEGATIVE    WBC, UA TOO NUMEROUS TO COUNT (A) 0 TO 5 /HPF    RBC, UA TOO NUMEROUS TO COUNT (A) 0 TO 2 /HPF    Epithelial Cells, UA 10 TO 20 /HPF    Bacteria, UA 3+ (A) None   Troponin   Result Value Ref Range    Troponin, High Sensitivity 8 0 - 9 ng/L   EKG 12 Lead   Result Value Ref Range    Ventricular Rate 85 BPM    Atrial Rate 85 BPM    P-R

## 2024-12-27 NOTE — CONSULTS
[facility]  [facility]  Consultation Note     Admit Date: 12/27/2024  1:02 AM    Reason for Consult:   Postpartum fever, endometritis versus pyelonephritis    Attending Physician:  Pavel Canales MD    HISTORY OF PRESENT ILLNESS:             The history is obtained from extensive review of available past medical records. The patient is a 24 y.o. female who is not previously known to the ID service.    Patient presented to the ED at [facility] on 12/27/2023 with fever.  She recently vaginally delivered a healthy baby on 12/23/2024.  She was discharged from postpartum on 12/25/2024 and developed cough, congestion, and fevers.  She had a Tmax of 102.5 °F orally at home.  She denies any issues with her urine, no dysuria, urgency, frequency.  On exam she has no CVA tenderness.  No abdominal pain.  No nausea, vomiting, diarrhea.  Vaginal discharge is normal with no odor.    WBC in the ED was 17.1.  This was 27.3 when she was discharged on 12/25/2024.  UA does show pyuria with 3+ bacteria.  CT of the abdomen suggest pyelonephritis.  There is no abscess noted.  She is going for a pelvic ultrasound this morning.  Her main complaint is dry cough.  She was swabbed for COVID and flu.  But her full respiratory panel was not done.  She was started on ceftriaxone.  ID was asked to see in consultation    Past Medical History:    No past medical history on file.  Past Surgical History:        Procedure Laterality Date    US I&D BREAST ABSCESS DEEP  8/12/2015    US I&D BREAST ABSCESS DEEP     Current Medications:   Scheduled Meds:   ferrous sulfate  325 mg Oral Every Other Day    [START ON 12/28/2024] cefTRIAXone (ROCEPHIN) IV  1,000 mg IntraVENous Q24H     Continuous Infusions:  PRN Meds:    Allergies:  Patient has no known allergies.    Social History:   Social History     Socioeconomic History    Marital status:    Tobacco Use    Smoking status: Never    Smokeless tobacco: Never   Vaping

## 2024-12-27 NOTE — PROGRESS NOTES
P Quality Flow/Interdisciplinary Rounds Progress Note        Quality Flow Rounds held on December 27, 2024    Disciplines Attending:  Bedside Nurse, , , and Nursing Unit Leadership    Anni Sandoval was admitted on 12/27/2024  1:02 AM    Anticipated Discharge Date:       Disposition:    Fidencio Score:  Fidencio Scale Score: 21    [facility] RISK OF UNPLANNED READMISSION 2.0             8.6 Total Score        Discussed patient goal for the day, patient clinical progression, and barriers to discharge.  The following Goal(s) of the Day/Commitment(s) have been identified:   Discharge Planning      Karin Santana RN  December 27, 2024

## 2024-12-27 NOTE — PROGRESS NOTES
[employer] to Dr. Canales RE: pt c/o dyspnea with exertion, cough, OB recommending breathing txs. See new order for albuterol q4h prn.

## 2024-12-27 NOTE — ED NOTES
ED to Inpatient Handoff Report    Notified 7th floor that electronic handoff available and patient ready for transport to room 731.    Safety Risks: None identified    Patient in Restraints: no    Constant Observer or Patient : no    Telemetry Monitoring Ordered :No           Order to transfer to unit without monitor:N/A    Last MEWS: 2 Time completed: 749    Deterioration Index Score:   Predictive Model Details          21 (Normal)  Factor Value    Calculated 12/27/2024 07:55 42% Respiratory rate 22    Deterioration Index Model 19% WBC count abnormal (17.1 k/uL)     12% Potassium 4.4 mmol/L     10% Pulse 99     10% Age 24 years old     6% Hematocrit abnormal (27.4 %)     1% Sodium 138 mmol/L     1% Temperature 98.9 °F (37.2 °C)     1% Pulse oximetry 98 %     0% Systolic 111        Vitals:    12/27/24 0531 12/27/24 0630 12/27/24 0714 12/27/24 0749   BP: 123/85 112/64  111/64   Pulse: 73 90  99   Resp: 18 19  22   Temp:   98.9 °F (37.2 °C) 98.9 °F (37.2 °C)   TempSrc:   Oral Oral   SpO2: 97% 98%  98%   Weight:       Height:             Opportunity for questions and clarification was provided.

## 2024-12-28 PROBLEM — E87.20 ACIDOSIS: Status: ACTIVE | Noted: 2024-12-28

## 2024-12-28 PROBLEM — B34.8 RHINOVIRUS INFECTION: Status: ACTIVE | Noted: 2024-12-28

## 2024-12-28 PROBLEM — E86.0 DEHYDRATION: Status: ACTIVE | Noted: 2024-12-28

## 2024-12-28 LAB
ANION GAP SERPL CALCULATED.3IONS-SCNC: 8 MMOL/L (ref 7–16)
BUN SERPL-MCNC: 10 MG/DL (ref 6–20)
CALCIUM SERPL-MCNC: 8.1 MG/DL (ref 8.6–10.2)
CHLORIDE SERPL-SCNC: 109 MMOL/L (ref 98–107)
CO2 SERPL-SCNC: 21 MMOL/L (ref 22–29)
CREAT SERPL-MCNC: 1 MG/DL (ref 0.5–1)
ERYTHROCYTE [DISTWIDTH] IN BLOOD BY AUTOMATED COUNT: 16.3 % (ref 11.5–15)
GFR, ESTIMATED: 82 ML/MIN/1.73M2
GLUCOSE SERPL-MCNC: 91 MG/DL (ref 74–99)
HCT VFR BLD AUTO: 26.6 % (ref 34–48)
HGB BLD-MCNC: 8 G/DL (ref 11.5–15.5)
MCH RBC QN AUTO: 26 PG (ref 26–35)
MCHC RBC AUTO-ENTMCNC: 30.1 G/DL (ref 32–34.5)
MCV RBC AUTO: 86.4 FL (ref 80–99.9)
PLATELET # BLD AUTO: 265 K/UL (ref 130–450)
PMV BLD AUTO: 9.6 FL (ref 7–12)
POTASSIUM SERPL-SCNC: 4.1 MMOL/L (ref 3.5–5)
RBC # BLD AUTO: 3.08 M/UL (ref 3.5–5.5)
SODIUM SERPL-SCNC: 138 MMOL/L (ref 132–146)
WBC OTHER # BLD: 13.1 K/UL (ref 4.5–11.5)

## 2024-12-28 PROCEDURE — 2500000003 HC RX 250 WO HCPCS: Performed by: REGISTERED NURSE

## 2024-12-28 PROCEDURE — 6360000002 HC RX W HCPCS: Performed by: REGISTERED NURSE

## 2024-12-28 PROCEDURE — 2060000000 HC ICU INTERMEDIATE R&B

## 2024-12-28 PROCEDURE — 99232 SBSQ HOSP IP/OBS MODERATE 35: CPT | Performed by: INTERNAL MEDICINE

## 2024-12-28 PROCEDURE — 80048 BASIC METABOLIC PNL TOTAL CA: CPT

## 2024-12-28 PROCEDURE — 85027 COMPLETE CBC AUTOMATED: CPT

## 2024-12-28 RX ADMIN — WATER 2000 MG: 1 INJECTION INTRAMUSCULAR; INTRAVENOUS; SUBCUTANEOUS at 06:20

## 2024-12-28 ASSESSMENT — PAIN DESCRIPTION - RADICULAR PAIN: RADICULAR_PAIN: ABSENT

## 2024-12-28 ASSESSMENT — PAIN SCALES - GENERAL: PAINLEVEL_OUTOF10: 0

## 2024-12-28 NOTE — PROGRESS NOTES
[facility] Hospitalist   Progress Note    Admitting Date and Time: 12/27/2024  1:02 AM  Admit Dx: Pyelonephritis [N12]    Subjective:    Patient was admitted with Pyelonephritis [N12]. Patient denies fever, chills, cp, sob, n/v.     ferrous sulfate  325 mg Oral Every Other Day    cefTRIAXone (ROCEPHIN) IV  2,000 mg IntraVENous Q24H     albuterol, 2.5 mg, Q4H PRN  acetaminophen, 650 mg, Q4H PRN         Objective:    /69   Pulse 82   Temp 97.9 °F (36.6 °C) (Oral)   Resp 16   Ht 1.676 m (5' 6\")   Wt 90.7 kg (200 lb)   LMP 03/02/2024   SpO2 96%   Breastfeeding Yes   BMI 32.28 kg/m²   Skin: warm and dry, no rash or erythema  Pulmonary/Chest: clear to auscultation bilaterally- no wheezes, rales or rhonchi, normal air movement, no respiratory distress  Cardiovascular: rhythm reg at rate of 84  Abdomen: soft, non-tender, non-distended, normal bowel sounds, no masses or organomegaly  Extremities: no cyanosis, no clubbing, and no edema      Recent Labs     12/27/24  0225 12/28/24  0309    138   K 4.4 4.1   * 109*   CO2 21* 21*   BUN 11 10   CREATININE 0.9 1.0   GLUCOSE 104* 91   CALCIUM 8.3* 8.1*       Recent Labs     12/27/24  0225 12/28/24  0309   WBC 17.1* 13.1*   RBC 3.16* 3.08*   HGB 8.0* 8.0*   HCT 27.4* 26.6*   MCV 86.7 86.4   MCH 25.3* 26.0   MCHC 29.2* 30.1*   RDW 15.9* 16.3*    265   MPV 9.8 9.6       CBC:   Lab Results   Component Value Date/Time    WBC 13.1 12/28/2024 03:09 AM    RBC 3.08 12/28/2024 03:09 AM    HGB 8.0 12/28/2024 03:09 AM    HCT 26.6 12/28/2024 03:09 AM    MCV 86.4 12/28/2024 03:09 AM    MCH 26.0 12/28/2024 03:09 AM    MCHC 30.1 12/28/2024 03:09 AM    RDW 16.3 12/28/2024 03:09 AM     12/28/2024 03:09 AM    MPV 9.6 12/28/2024 03:09 AM     BMP:    Lab Results   Component Value Date/Time     12/28/2024 03:09 AM    K 4.1 12/28/2024 03:09 AM     12/28/2024 03:09 AM    CO2 21 12/28/2024 03:09 AM    BUN 10 12/28/2024 03:09 AM     Home

## 2024-12-28 NOTE — PROGRESS NOTES
[facility]  [facility]  Progress Note    SUBJECTIVE:  Chief Complaint   Patient presents with    Fever     Reports vaginal delivery on 2024, fever since leaving the hospital as high as 102. Dry cough, bilateral leg swelling.      Patient is tolerating medications. No reported adverse drug reactions.  No nausea, vomiting, diarrhea.  Otherwise resting in bed.  There are some family present    Review of systems:  As stated above in the chief complaint, otherwise negative.    Medications:  Scheduled Meds:   ferrous sulfate  325 mg Oral Every Other Day    cefTRIAXone (ROCEPHIN) IV  2,000 mg IntraVENous Q24H     Continuous Infusions:  PRN Meds:albuterol, acetaminophen    OBJECTIVE:  /76   Pulse 80   Temp 97.9 °F (36.6 °C) (Oral)   Resp 16   Ht 1.676 m (5' 6\")   Wt 90.7 kg (200 lb)   LMP 2024   SpO2 97%   Breastfeeding Yes   BMI 32.28 kg/m²   Temp  Av.2 °F (37.3 °C)  Min: 97.9 °F (36.6 °C)  Max: 100.9 °F (38.3 °C)  Constitutional: The patient is awake, alert, and oriented.   Skin: Warm and dry. No rashes were noted.   HEENT: Round and reactive pupils.  Moist mucous membranes.  No ulcerations or thrush.  Neck: Supple to movements.   Chest: No use of accessory muscles to breathe. Symmetrical expansion.  No wheezing, crackles or rhonchi.  Cardiovascular: S1 and S2 are rhythmic and regular. No murmurs appreciated.   Abdomen: Positive bowel sounds to auscultation.  Softly distended.  Benign to palpation. No masses felt. No hepatosplenomegaly.  Extremities: No clubbing, no cyanosis, no edema.  Lines: Peripheral.    Laboratory and Tests:  Lab Results   Component Value Date    .0 (H) 2024     Lab Results   Component Value Date    SEDRATE 92 (H) 2024         Radiology:  Noted     Microbiology:  Blood cultures 2024: Negative so far  RSV: Not detected  COVID-19 rapid: Not detected   influenza A and B PCR: Not detected     urine with E. coli

## 2024-12-29 VITALS
HEIGHT: 66 IN | OXYGEN SATURATION: 100 % | HEART RATE: 79 BPM | SYSTOLIC BLOOD PRESSURE: 122 MMHG | TEMPERATURE: 98.5 F | RESPIRATION RATE: 18 BRPM | DIASTOLIC BLOOD PRESSURE: 80 MMHG | WEIGHT: 200 LBS | BODY MASS INDEX: 32.14 KG/M2

## 2024-12-29 LAB
ANION GAP SERPL CALCULATED.3IONS-SCNC: 9 MMOL/L (ref 7–16)
BUN SERPL-MCNC: 14 MG/DL (ref 6–20)
CALCIUM SERPL-MCNC: 8.3 MG/DL (ref 8.6–10.2)
CHLORIDE SERPL-SCNC: 108 MMOL/L (ref 98–107)
CO2 SERPL-SCNC: 21 MMOL/L (ref 22–29)
CREAT SERPL-MCNC: 0.8 MG/DL (ref 0.5–1)
ERYTHROCYTE [DISTWIDTH] IN BLOOD BY AUTOMATED COUNT: 16 % (ref 11.5–15)
GFR, ESTIMATED: >90 ML/MIN/1.73M2
GLUCOSE SERPL-MCNC: 92 MG/DL (ref 74–99)
HCT VFR BLD AUTO: 27 % (ref 34–48)
HGB BLD-MCNC: 8 G/DL (ref 11.5–15.5)
MCH RBC QN AUTO: 25.9 PG (ref 26–35)
MCHC RBC AUTO-ENTMCNC: 29.6 G/DL (ref 32–34.5)
MCV RBC AUTO: 87.4 FL (ref 80–99.9)
MICROORGANISM SPEC CULT: ABNORMAL
PLATELET # BLD AUTO: 304 K/UL (ref 130–450)
PMV BLD AUTO: 9.4 FL (ref 7–12)
POTASSIUM SERPL-SCNC: 4.4 MMOL/L (ref 3.5–5)
RBC # BLD AUTO: 3.09 M/UL (ref 3.5–5.5)
SERVICE CMNT-IMP: ABNORMAL
SODIUM SERPL-SCNC: 138 MMOL/L (ref 132–146)
SPECIMEN DESCRIPTION: ABNORMAL
WBC OTHER # BLD: 11.8 K/UL (ref 4.5–11.5)

## 2024-12-29 PROCEDURE — 80048 BASIC METABOLIC PNL TOTAL CA: CPT

## 2024-12-29 PROCEDURE — 6360000002 HC RX W HCPCS: Performed by: REGISTERED NURSE

## 2024-12-29 PROCEDURE — 6370000000 HC RX 637 (ALT 250 FOR IP): Performed by: CLINICAL NURSE SPECIALIST

## 2024-12-29 PROCEDURE — 6370000000 HC RX 637 (ALT 250 FOR IP): Performed by: STUDENT IN AN ORGANIZED HEALTH CARE EDUCATION/TRAINING PROGRAM

## 2024-12-29 PROCEDURE — 99239 HOSP IP/OBS DSCHRG MGMT >30: CPT | Performed by: INTERNAL MEDICINE

## 2024-12-29 PROCEDURE — 2500000003 HC RX 250 WO HCPCS: Performed by: REGISTERED NURSE

## 2024-12-29 PROCEDURE — 85027 COMPLETE CBC AUTOMATED: CPT

## 2024-12-29 RX ORDER — CEFDINIR 300 MG/1
300 CAPSULE ORAL EVERY 12 HOURS SCHEDULED
Status: DISCONTINUED | OUTPATIENT
Start: 2024-12-29 | End: 2024-12-29 | Stop reason: HOSPADM

## 2024-12-29 RX ORDER — CEFDINIR 300 MG/1
300 CAPSULE ORAL EVERY 12 HOURS SCHEDULED
Qty: 20 CAPSULE | Refills: 0 | Status: SHIPPED | OUTPATIENT
Start: 2024-12-29 | End: 2025-01-08

## 2024-12-29 RX ADMIN — FERROUS SULFATE TAB 325 MG (65 MG ELEMENTAL FE) 325 MG: 325 (65 FE) TAB at 07:44

## 2024-12-29 RX ADMIN — WATER 2000 MG: 1 INJECTION INTRAMUSCULAR; INTRAVENOUS; SUBCUTANEOUS at 06:59

## 2024-12-29 RX ADMIN — CEFDINIR 300 MG: 300 CAPSULE ORAL at 11:12

## 2024-12-29 ASSESSMENT — PAIN DESCRIPTION - RADICULAR PAIN: RADICULAR_PAIN: ABSENT

## 2024-12-29 NOTE — PROGRESS NOTES
[facility]  [facility]  Progress Note    SUBJECTIVE:  Chief Complaint   Patient presents with    Fever     Reports vaginal delivery on 2024, fever since leaving the hospital as high as 102. Dry cough, bilateral leg swelling.      Patient is tolerating medications. No reported adverse drug reactions.  No nausea, vomiting, diarrhea.  Feeling much improved she stated    Review of systems:  As stated above in the chief complaint, otherwise negative.    Medications:  Scheduled Meds:   ferrous sulfate  325 mg Oral Every Other Day    cefTRIAXone (ROCEPHIN) IV  2,000 mg IntraVENous Q24H     Continuous Infusions:  PRN Meds:albuterol, acetaminophen    OBJECTIVE:  /79   Pulse 70   Temp 98 °F (36.7 °C) (Oral)   Resp 20   Ht 1.676 m (5' 6\")   Wt 90.7 kg (200 lb)   LMP 2024   SpO2 98%   Breastfeeding Yes   BMI 32.28 kg/m²   Temp  Av.2 °F (36.8 °C)  Min: 97.9 °F (36.6 °C)  Max: 98.7 °F (37.1 °C)  Constitutional: The patient is awake, alert, and oriented.  Sitting in bed family present  Skin: Warm and dry. No rashes were noted.   HEENT: Round and reactive pupils.  Moist mucous membranes.  No ulcerations or thrush.  Neck: Supple to movements.   Chest: No use of accessory muscles to breathe. Symmetrical expansion.  No wheezing, crackles or rhonchi.  Cardiovascular: S1 and S2 are rhythmic and regular. No murmurs appreciated.   Abdomen: Positive bowel sounds to auscultation.  Softly distended.  Benign to palpation. No masses felt. No hepatosplenomegaly.  Extremities: No clubbing, no cyanosis, no edema.  Lines: Peripheral.    Laboratory and Tests:  Lab Results   Component Value Date    .0 (H) 2024     Lab Results   Component Value Date    SEDRATE 92 (H) 2024         Radiology:  Noted     Microbiology:  Blood cultures 2024: Negative so far  RSV: Not detected  COVID-19 rapid: Not detected   influenza A and B PCR: Not detected     urine with E.

## 2024-12-29 NOTE — DISCHARGE SUMMARY
[facility] Hospitalist       Hospitalist Physician Discharge Summary       No follow-up provider specified.    Activity level: as dyana    Diet: ADULT DIET; Regular    Dispo:home    Condition at discharge: fair          Patient ID:  Anni Sandoval  [id number]  24 y.o.  2000    Admit date: 12/27/2024    Discharge date and time:  12/29/2024  11:31 AM    Admission Diagnoses: Principal Problem:    Pyelonephritis  Active Problems:    Postpartum fever    Rhinovirus infection    Dehydration    Acidosis  Resolved Problems:    * No resolved hospital problems. *      Discharge Diagnoses: Principal Problem:    Pyelonephritis  Active Problems:    Postpartum fever    Rhinovirus infection    Dehydration    Acidosis  Resolved Problems:    * No resolved hospital problems. *    Sepsis  Pyelonephritis  Rhinovirus infection  Dehydration  acidosis      Consults:  IP CONSULT TO HOSPITALIST  IP CONSULT TO OB GYN  IP CONSULT TO INFECTIOUS DISEASES    Procedures: none    Hospital Course: Patient was admitted with Pyelonephritis [N12]. Patient is a 24 year old female with past medical history of vaginal delivery 12/23/24 without complications who presents to the ER with fever, cough, congestion and leg swelling. Vitals stable. Lab workup: CO2 21, Calcium 8.3, , Troponin 13 > 8, WBC 17.1, hgb 8.0, UA + for infection. CXR negative. CT abdomen showing right pyelonephritis. She was given ceftriaxone and 1 L bolus in ER. Decision to admit.     Pt seen and examined by consultants. Pt improved with abx and iv fluids. On day of discharge, pt denied fevers, chills,n/v. Discharge planning d/w pt. Time given for questions and all questions answered.       Discharge Exam:  Vitals:    12/29/24 0334 12/29/24 0645 12/29/24 0745 12/29/24 1058   BP: 135/77 122/79  122/80   Pulse: 77 69 70 79   Resp: 16 20  18   Temp: 98.7 °F (37.1 °C) 98 °F (36.7 °C)  98.5 °F (36.9 °C)   TempSrc: Oral Oral     SpO2: 99% 98%  100%   Weight:

## 2024-12-31 LAB — SURGICAL PATHOLOGY REPORT: NORMAL

## 2025-01-01 LAB
MICROORGANISM SPEC CULT: NORMAL
MICROORGANISM SPEC CULT: NORMAL
SERVICE CMNT-IMP: NORMAL
SERVICE CMNT-IMP: NORMAL
SPECIMEN DESCRIPTION: NORMAL
SPECIMEN DESCRIPTION: NORMAL

## 2025-01-27 PROBLEM — E86.0 DEHYDRATION: Status: RESOLVED | Noted: 2024-12-28 | Resolved: 2025-01-27

## 2025-02-12 ENCOUNTER — POSTPARTUM VISIT (OUTPATIENT)
Dept: OBGYN | Age: 25
End: 2025-02-12
Payer: COMMERCIAL

## 2025-02-12 VITALS
HEART RATE: 83 BPM | WEIGHT: 170 LBS | TEMPERATURE: 98.1 F | BODY MASS INDEX: 27.44 KG/M2 | OXYGEN SATURATION: 99 % | SYSTOLIC BLOOD PRESSURE: 130 MMHG | DIASTOLIC BLOOD PRESSURE: 77 MMHG

## 2025-02-12 PROCEDURE — 99213 OFFICE O/P EST LOW 20 MIN: CPT | Performed by: OBSTETRICS & GYNECOLOGY

## 2025-02-12 PROCEDURE — 0503F POSTPARTUM CARE VISIT: CPT | Performed by: OBSTETRICS & GYNECOLOGY

## 2025-02-12 ASSESSMENT — PATIENT HEALTH QUESTIONNAIRE - PHQ9
SUM OF ALL RESPONSES TO PHQ QUESTIONS 1-9: 0
SUM OF ALL RESPONSES TO PHQ QUESTIONS 1-9: 0
SUM OF ALL RESPONSES TO PHQ9 QUESTIONS 1 & 2: 0
SUM OF ALL RESPONSES TO PHQ QUESTIONS 1-9: 0
SUM OF ALL RESPONSES TO PHQ QUESTIONS 1-9: 0
1. LITTLE INTEREST OR PLEASURE IN DOING THINGS: NOT AT ALL
2. FEELING DOWN, DEPRESSED OR HOPELESS: NOT AT ALL

## 2025-02-12 ASSESSMENT — ENCOUNTER SYMPTOMS
WHEEZING: 0
BLOOD IN STOOL: 1
VOMITING: 0
ABDOMINAL PAIN: 0
CONSTIPATION: 0
NAUSEA: 0
DIARRHEA: 0
SHORTNESS OF BREATH: 0
COUGH: 0

## 2025-02-12 NOTE — PROGRESS NOTES
Anni Sandoval is a 24-year-old G1, P1 female whose LMP is 2025.  She had an  on 2024 at 38 weeks.  Bottlefeeding.  FOB is involved and she feels safe with him.  We have already completed paperwork for work.  Not currently interested in any contraception.  Reports no change in PMH PSH FH or allergy history.  Date of last Cervical Cancer screen (HPV or PAP): 10/9/2023 negative      GYN History  Abn pap no  STI no  LEEP/ cryo/cone no  Uterine surgery no  Ovary or tubal surgery no    Patient presents for postpartum exam    No past medical history on file.     Past Surgical History:   Procedure Laterality Date     I&D BREAST ABSCESS DEEP  2015     I&D BREAST ABSCESS DEEP        Family History   Problem Relation Age of Onset    Diabetes Paternal Grandmother           Current Outpatient Medications:     ferrous sulfate (IRON 325) 325 (65 Fe) MG tablet, Take 1 tablet by mouth every other day, Disp: 30 tablet, Rfl: 3    Prenatal MV-Min-Fe Fum-FA-DHA (PRENATAL 1 PO), Take by mouth (Patient not taking: Reported on 2025), Disp: , Rfl:      No Known Allergies     Social History       Tobacco History       Smoking Status  Never      Smokeless Tobacco Use  Never              Alcohol History       Alcohol Use Status  Yes Drinks/Week  0 Glasses of wine, 0 Cans of beer, 0 Shots of liquor, 2 Drinks containing 0.5 oz of alcohol per week Amount  2.0 standard drinks of alcohol/wk              Drug Use       Drug Use Status  Never              Sexual Activity       Sexually Active  Yes Partners  Male                     Review of Systems   Constitutional:  Negative for fever.   HENT:  Negative for hearing loss.    Eyes:  Negative for visual disturbance.   Respiratory:  Negative for cough, shortness of breath and wheezing.    Cardiovascular:  Negative for chest pain and palpitations.   Gastrointestinal:  Positive for blood in stool (Right red with bowel movement). Negative for abdominal pain, constipation,

## 2025-02-12 NOTE — PROGRESS NOTES
Ruben Marie called to request a refill on his medication.      Last office visit : 7/10/2024   Next office visit : Visit date not found     Requested Prescriptions     Pending Prescriptions Disp Refills    Continuous Glucose Sensor (DEXCOM G7 SENSOR) MISC 3 each 5     Sig: For continuous monitoring    metFORMIN (GLUCOPHAGE-XR) 500 MG extended release tablet 90 tablet 3     Sig: Take 1 tablet by mouth daily (with breakfast) TAKE 1 TABLET BY MOUTH EVERY DAY WITH BREAKFAST            Olga Soler LPN    Here today for post partum care   2024 boy   8# 2 and is formula feeding  Post partum depression screen completed with a score of 0.   Patient has no complaints today  Discharge instructions have been discussed with the patient. Patient advised to call our office with any questions or concerns.   Voiced understanding.